# Patient Record
Sex: MALE | Race: BLACK OR AFRICAN AMERICAN | NOT HISPANIC OR LATINO | Employment: FULL TIME | RURAL
[De-identification: names, ages, dates, MRNs, and addresses within clinical notes are randomized per-mention and may not be internally consistent; named-entity substitution may affect disease eponyms.]

---

## 2023-01-24 ENCOUNTER — OFFICE VISIT (OUTPATIENT)
Dept: FAMILY MEDICINE | Facility: CLINIC | Age: 43
End: 2023-01-24
Payer: COMMERCIAL

## 2023-01-24 VITALS
WEIGHT: 227.38 LBS | HEIGHT: 68 IN | TEMPERATURE: 100 F | HEART RATE: 90 BPM | SYSTOLIC BLOOD PRESSURE: 135 MMHG | DIASTOLIC BLOOD PRESSURE: 77 MMHG | OXYGEN SATURATION: 99 % | BODY MASS INDEX: 34.46 KG/M2 | RESPIRATION RATE: 18 BRPM

## 2023-01-24 DIAGNOSIS — Z11.59 SCREENING FOR VIRAL DISEASE: ICD-10-CM

## 2023-01-24 DIAGNOSIS — Z79.899 OTHER LONG TERM (CURRENT) DRUG THERAPY: ICD-10-CM

## 2023-01-24 DIAGNOSIS — Z13.6 ENCOUNTER FOR SCREENING FOR CARDIOVASCULAR DISORDERS: ICD-10-CM

## 2023-01-24 DIAGNOSIS — R73.9 HYPERGLYCEMIA: ICD-10-CM

## 2023-01-24 DIAGNOSIS — R03.0 ELEVATED BLOOD PRESSURE READING: Primary | ICD-10-CM

## 2023-01-24 DIAGNOSIS — Z12.5 SCREENING FOR MALIGNANT NEOPLASM OF PROSTATE: ICD-10-CM

## 2023-01-24 DIAGNOSIS — L73.2 HIDRADENITIS SUPPURATIVA OF RIGHT AXILLA: ICD-10-CM

## 2023-01-24 LAB
25(OH)D3 SERPL-MCNC: 16.2 NG/ML
ALBUMIN SERPL BCP-MCNC: 2.4 G/DL (ref 3.5–5)
ALBUMIN/GLOB SERPL: 0.4 {RATIO}
ALP SERPL-CCNC: 74 U/L (ref 45–115)
ALT SERPL W P-5'-P-CCNC: 17 U/L (ref 16–61)
ANION GAP SERPL CALCULATED.3IONS-SCNC: 12 MMOL/L (ref 7–16)
AST SERPL W P-5'-P-CCNC: 14 U/L (ref 15–37)
BASOPHILS # BLD AUTO: 0.07 K/UL (ref 0–0.2)
BASOPHILS NFR BLD AUTO: 0.4 % (ref 0–1)
BILIRUB SERPL-MCNC: 0.3 MG/DL (ref ?–1.2)
BUN SERPL-MCNC: 10 MG/DL (ref 7–18)
BUN/CREAT SERPL: 11 (ref 6–20)
CALCIUM SERPL-MCNC: 8.6 MG/DL (ref 8.5–10.1)
CHLORIDE SERPL-SCNC: 104 MMOL/L (ref 98–107)
CHOLEST SERPL-MCNC: 179 MG/DL (ref 0–200)
CHOLEST/HDLC SERPL: 3.8 {RATIO}
CO2 SERPL-SCNC: 26 MMOL/L (ref 21–32)
CREAT SERPL-MCNC: 0.87 MG/DL (ref 0.7–1.3)
DIFFERENTIAL METHOD BLD: ABNORMAL
EGFR (NO RACE VARIABLE) (RUSH/TITUS): 111 ML/MIN/1.73M²
EOSINOPHIL # BLD AUTO: 0.4 K/UL (ref 0–0.5)
EOSINOPHIL NFR BLD AUTO: 2.2 % (ref 1–4)
ERYTHROCYTE [DISTWIDTH] IN BLOOD BY AUTOMATED COUNT: 14.9 % (ref 11.5–14.5)
EST. AVERAGE GLUCOSE BLD GHB EST-MCNC: 107 MG/DL
FOLATE SERPL-MCNC: 4.1 NG/ML (ref 3.1–17.5)
GLOBULIN SER-MCNC: 6.6 G/DL (ref 2–4)
GLUCOSE SERPL-MCNC: 77 MG/DL (ref 74–106)
HBA1C MFR BLD HPLC: 5.8 % (ref 4.5–6.6)
HCT VFR BLD AUTO: 31.9 % (ref 40–54)
HCV AB SER QL: NORMAL
HDLC SERPL-MCNC: 47 MG/DL (ref 40–60)
HGB BLD-MCNC: 9.8 G/DL (ref 13.5–18)
HIV 1+O+2 AB SERPL QL: NORMAL
IMM GRANULOCYTES # BLD AUTO: 0.16 K/UL (ref 0–0.04)
IMM GRANULOCYTES NFR BLD: 0.9 % (ref 0–0.4)
LDLC SERPL CALC-MCNC: 117 MG/DL
LDLC/HDLC SERPL: 2.5 {RATIO}
LYMPHOCYTES # BLD AUTO: 1.84 K/UL (ref 1–4.8)
LYMPHOCYTES NFR BLD AUTO: 10.2 % (ref 27–41)
MCH RBC QN AUTO: 28.6 PG (ref 27–31)
MCHC RBC AUTO-ENTMCNC: 30.7 G/DL (ref 32–36)
MCV RBC AUTO: 93 FL (ref 80–96)
MONOCYTES # BLD AUTO: 1.33 K/UL (ref 0–0.8)
MONOCYTES NFR BLD AUTO: 7.4 % (ref 2–6)
MPC BLD CALC-MCNC: 9.5 FL (ref 9.4–12.4)
NEUTROPHILS # BLD AUTO: 14.29 K/UL (ref 1.8–7.7)
NEUTROPHILS NFR BLD AUTO: 78.9 % (ref 53–65)
NONHDLC SERPL-MCNC: 132 MG/DL
NRBC # BLD AUTO: 0 X10E3/UL
NRBC, AUTO (.00): 0 %
PLATELET # BLD AUTO: 492 K/UL (ref 150–400)
POTASSIUM SERPL-SCNC: 4.2 MMOL/L (ref 3.5–5.1)
PROT SERPL-MCNC: 9 G/DL (ref 6.4–8.2)
PSA SERPL-MCNC: 1.23 NG/ML
RBC # BLD AUTO: 3.43 M/UL (ref 4.6–6.2)
SODIUM SERPL-SCNC: 138 MMOL/L (ref 136–145)
T4 FREE SERPL-MCNC: 1.17 NG/DL (ref 0.76–1.46)
TRIGL SERPL-MCNC: 73 MG/DL (ref 35–150)
TSH SERPL DL<=0.005 MIU/L-ACNC: 2.82 UIU/ML (ref 0.36–3.74)
VIT B12 SERPL-MCNC: 420 PG/ML (ref 193–986)
VLDLC SERPL-MCNC: 15 MG/DL
WBC # BLD AUTO: 18.09 K/UL (ref 4.5–11)

## 2023-01-24 PROCEDURE — 83036 HEMOGLOBIN A1C: ICD-10-PCS | Mod: ,,, | Performed by: CLINICAL MEDICAL LABORATORY

## 2023-01-24 PROCEDURE — 87389 HIV 1 / 2 ANTIBODY: ICD-10-PCS | Mod: ,,, | Performed by: CLINICAL MEDICAL LABORATORY

## 2023-01-24 PROCEDURE — 99214 PR OFFICE/OUTPT VISIT, EST, LEVL IV, 30-39 MIN: ICD-10-PCS | Mod: ,,, | Performed by: NURSE PRACTITIONER

## 2023-01-24 PROCEDURE — 85025 COMPLETE CBC W/AUTO DIFF WBC: CPT | Mod: ,,, | Performed by: CLINICAL MEDICAL LABORATORY

## 2023-01-24 PROCEDURE — G0103 PSA, SCREENING: ICD-10-PCS | Mod: ,,, | Performed by: CLINICAL MEDICAL LABORATORY

## 2023-01-24 PROCEDURE — 80061 LIPID PANEL: ICD-10-PCS | Mod: ,,, | Performed by: CLINICAL MEDICAL LABORATORY

## 2023-01-24 PROCEDURE — 84443 THYROID PANEL: ICD-10-PCS | Mod: ,,, | Performed by: CLINICAL MEDICAL LABORATORY

## 2023-01-24 PROCEDURE — 82043 MICROALBUMIN / CREATININE RATIO URINE: ICD-10-PCS | Mod: ,,, | Performed by: CLINICAL MEDICAL LABORATORY

## 2023-01-24 PROCEDURE — 83036 HEMOGLOBIN GLYCOSYLATED A1C: CPT | Mod: ,,, | Performed by: CLINICAL MEDICAL LABORATORY

## 2023-01-24 PROCEDURE — 86803 HEPATITIS C ANTIBODY: ICD-10-PCS | Mod: ,,, | Performed by: CLINICAL MEDICAL LABORATORY

## 2023-01-24 PROCEDURE — 86803 HEPATITIS C AB TEST: CPT | Mod: ,,, | Performed by: CLINICAL MEDICAL LABORATORY

## 2023-01-24 PROCEDURE — 87389 HIV-1 AG W/HIV-1&-2 AB AG IA: CPT | Mod: ,,, | Performed by: CLINICAL MEDICAL LABORATORY

## 2023-01-24 PROCEDURE — 82306 VITAMIN D: ICD-10-PCS | Mod: ,,, | Performed by: CLINICAL MEDICAL LABORATORY

## 2023-01-24 PROCEDURE — 82746 ASSAY OF FOLIC ACID SERUM: CPT | Mod: ,,, | Performed by: CLINICAL MEDICAL LABORATORY

## 2023-01-24 PROCEDURE — G0103 PSA SCREENING: HCPCS | Mod: ,,, | Performed by: CLINICAL MEDICAL LABORATORY

## 2023-01-24 PROCEDURE — 82607 VITAMIN B12/FOLATE, SERUM PANEL: ICD-10-PCS | Mod: ,,, | Performed by: CLINICAL MEDICAL LABORATORY

## 2023-01-24 PROCEDURE — 80053 COMPREHENSIVE METABOLIC PANEL: ICD-10-PCS | Mod: ,,, | Performed by: CLINICAL MEDICAL LABORATORY

## 2023-01-24 PROCEDURE — 84439 THYROID PANEL: ICD-10-PCS | Mod: ,,, | Performed by: CLINICAL MEDICAL LABORATORY

## 2023-01-24 PROCEDURE — 80053 COMPREHEN METABOLIC PANEL: CPT | Mod: ,,, | Performed by: CLINICAL MEDICAL LABORATORY

## 2023-01-24 PROCEDURE — 82570 MICROALBUMIN / CREATININE RATIO URINE: ICD-10-PCS | Mod: ,,, | Performed by: CLINICAL MEDICAL LABORATORY

## 2023-01-24 PROCEDURE — 82570 ASSAY OF URINE CREATININE: CPT | Mod: ,,, | Performed by: CLINICAL MEDICAL LABORATORY

## 2023-01-24 PROCEDURE — 85025 CBC WITH DIFFERENTIAL: ICD-10-PCS | Mod: ,,, | Performed by: CLINICAL MEDICAL LABORATORY

## 2023-01-24 PROCEDURE — 82043 UR ALBUMIN QUANTITATIVE: CPT | Mod: ,,, | Performed by: CLINICAL MEDICAL LABORATORY

## 2023-01-24 PROCEDURE — 84443 ASSAY THYROID STIM HORMONE: CPT | Mod: ,,, | Performed by: CLINICAL MEDICAL LABORATORY

## 2023-01-24 PROCEDURE — 99214 OFFICE O/P EST MOD 30 MIN: CPT | Mod: ,,, | Performed by: NURSE PRACTITIONER

## 2023-01-24 PROCEDURE — 84439 ASSAY OF FREE THYROXINE: CPT | Mod: ,,, | Performed by: CLINICAL MEDICAL LABORATORY

## 2023-01-24 PROCEDURE — 82306 VITAMIN D 25 HYDROXY: CPT | Mod: ,,, | Performed by: CLINICAL MEDICAL LABORATORY

## 2023-01-24 PROCEDURE — 82607 VITAMIN B-12: CPT | Mod: ,,, | Performed by: CLINICAL MEDICAL LABORATORY

## 2023-01-24 PROCEDURE — 80061 LIPID PANEL: CPT | Mod: ,,, | Performed by: CLINICAL MEDICAL LABORATORY

## 2023-01-24 PROCEDURE — 82746 VITAMIN B12/FOLATE, SERUM PANEL: ICD-10-PCS | Mod: ,,, | Performed by: CLINICAL MEDICAL LABORATORY

## 2023-01-24 RX ORDER — CLINDAMYCIN PHOSPHATE 10 MG/G
GEL TOPICAL 2 TIMES DAILY
Qty: 60 G | Refills: 0 | Status: SHIPPED | OUTPATIENT
Start: 2023-01-24

## 2023-01-24 RX ORDER — DOXYCYCLINE 100 MG/1
100 CAPSULE ORAL 2 TIMES DAILY
Qty: 14 CAPSULE | Refills: 0 | Status: SHIPPED | OUTPATIENT
Start: 2023-01-24 | End: 2023-05-16

## 2023-01-24 NOTE — LETTER
January 24, 2023      [facility]  [address] [address]  Phone: [phone]  Fax: [phone]       Patient: Ulisses Ortiz   YOB: 1980  Date of Visit: 01/24/2023    To Whom It May Concern:    Andrés Ortiz  was at [facility] on 01/24/2023. The patient may return to work/school on 01/25/2023 with no restrictions. If you have any questions or concerns, or if I can be of further assistance, please do not hesitate to contact me.    Sincerely,    Monica Garcia RN

## 2023-01-24 NOTE — PROGRESS NOTES
"   Leora Laird DNP   [address] [place] [address]     PATIENT NAME: Ulisses Ortiz  : 1980  DATE: 23  MRN: [id number]      Billing Provider: Leora Laird DNP  Level of Service:   Patient PCP Information       Provider PCP Type    Leora Laird DNP General            Reason for Visit / Chief Complaint: new Patient and HS (Hidradenitis Suppurativa )       Update PCP  Update Chief Complaint         History of Present Illness / Problem Focused Workflow     Ulisses Ortiz presents to the clinic with new Patient and HS (Hidradenitis Suppurativa )     HPI    Review of Systems     Review of Systems     Medical / Social / Family History     Past Medical History:   Diagnosis Date    Hidradenitis suppurativa        History reviewed. No pertinent surgical history.    Social History    reports that he has never smoked. He has never been exposed to tobacco smoke. He has never used smokeless tobacco. He reports current alcohol use. He reports current drug use. Drug: Marijuana.    Family History  's family history includes Diabetes in his mother; Hypertension in his father.    Medications and Allergies     Medications  No outpatient medications have been marked as taking for the 23 encounter (Office Visit) with Leora Laird DNP.       Allergies  Review of patient's allergies indicates:  No Known Allergies    Physical Examination   /77 (BP Location: Left arm, Patient Position: Sitting, BP Method: X-Large (Automatic))   Pulse 90   Temp 99.5 °F (37.5 °C) (Oral)   Resp 18   Ht 5' 8" (1.727 m)   Wt 103.1 kg (227 lb 6.4 oz)   SpO2 99%   BMI 34.58 kg/m²    Physical Exam  Vitals and nursing note reviewed.   Constitutional:       Appearance: Normal appearance. He is obese.   HENT:      Head: Normocephalic and atraumatic.      Nose: Nose normal.      Mouth/Throat:      Mouth: Mucous membranes are moist.   Eyes:      Pupils: Pupils are equal, round, and reactive to light. "   Cardiovascular:      Rate and Rhythm: Normal rate and regular rhythm.      Pulses: Normal pulses.      Heart sounds: Normal heart sounds.   Pulmonary:      Effort: Pulmonary effort is normal.      Breath sounds: Normal breath sounds.   Abdominal:      General: Abdomen is flat. Bowel sounds are normal.      Palpations: Abdomen is soft.   Musculoskeletal:         General: Normal range of motion.      Cervical back: Normal range of motion and neck supple.   Skin:     General: Skin is warm and dry.      Capillary Refill: Capillary refill takes less than 2 seconds.      Findings: Lesion present.      Comments: Multiple scattered group HS to face, some scarring and tracks noted.  Painful and worse to rt axilla.   And groin    Neurological:      General: No focal deficit present.      Mental Status: He is alert and oriented to person, place, and time. Mental status is at baseline.        Assessment and Plan (including Health Maintenance)      Problem List  [employer]  Document Outside HM   :    Plan:         Health Maintenance Due   Topic Date Due    Hepatitis C Screening  Never done    Lipid Panel  Never done    COVID-19 Vaccine (1) Never done    HIV Screening  Never done    TETANUS VACCINE  Never done    Hemoglobin A1c (Diabetic Prevention Screening)  Never done    Influenza Vaccine (1) Never done       Problem List Items Addressed This Visit          Derm    Hidradenitis suppurativa of right axilla    Relevant Orders    Ambulatory referral/consult to Dermatology       Cardiac/Vascular    Encounter for screening for cardiovascular disorders    Relevant Orders    Lipid Panel    Elevated blood pressure reading - Primary    Relevant Orders    CBC Auto Differential    Comprehensive Metabolic Panel    Microalbumin/Creatinine Ratio, Urine       ID    Screening for viral disease    Relevant Orders    Hepatitis C Antibody    HIV 1/2 Ag/Ab (4th Gen)       Endocrine    Hyperglycemia    Relevant Orders    Hemoglobin A1C        Palliative Care    Other long term (current) drug therapy    Relevant Orders    Vitamin D    Vitamin B12 & Folate    Thyroid Panel       The patient has no Health Maintenance topics of status Not Due    No future appointments.         Signature:  Leora Laird, RONIT      [address] [place] [address]    Date of encounter: 1/24/23

## 2023-01-25 LAB
CREAT UR-MCNC: 191 MG/DL (ref 39–259)
MICROALBUMIN UR-MCNC: 1.5 MG/DL (ref 0–2.8)
MICROALBUMIN/CREAT RATIO PNL UR: 7.9 MG/G (ref 0–30)

## 2023-01-25 RX ORDER — ERGOCALCIFEROL 1.25 MG/1
50000 CAPSULE ORAL
COMMUNITY
End: 2023-01-25 | Stop reason: SDUPTHER

## 2023-01-25 RX ORDER — ERGOCALCIFEROL 1.25 MG/1
50000 CAPSULE ORAL
Qty: 12 CAPSULE | Refills: 1 | Status: SHIPPED | OUTPATIENT
Start: 2023-01-25 | End: 2023-02-21 | Stop reason: SDUPTHER

## 2023-01-25 NOTE — PROGRESS NOTES
Cbc is way off... needs to come back in 1 week since I have no baseline and do cbc and iron panel..  Also needs vit d 13339 weekly.. maybe he had been sick recently or something?

## 2023-01-25 NOTE — TELEPHONE ENCOUNTER
----- Message from Leora Laird DNP sent at 1/25/2023  3:17 PM CST -----  Cbc is way off... needs to come back in 1 week since I have no baseline and do cbc and iron panel..  Also needs vit d 53946 weekly.. maybe he had been sick recently or something?

## 2023-01-31 ENCOUNTER — CLINICAL SUPPORT (OUTPATIENT)
Dept: FAMILY MEDICINE | Facility: CLINIC | Age: 43
End: 2023-01-31

## 2023-01-31 DIAGNOSIS — Z11.59 SCREENING FOR VIRAL DISEASE: Primary | ICD-10-CM

## 2023-01-31 PROCEDURE — 99499 UNLISTED E&M SERVICE: CPT | Mod: ,,, | Performed by: NURSE PRACTITIONER

## 2023-01-31 PROCEDURE — 85025 COMPLETE CBC W/AUTO DIFF WBC: CPT | Mod: ,,, | Performed by: CLINICAL MEDICAL LABORATORY

## 2023-01-31 PROCEDURE — 99499 NO LOS: ICD-10-PCS | Mod: ,,, | Performed by: NURSE PRACTITIONER

## 2023-01-31 PROCEDURE — 85025 CBC WITH DIFFERENTIAL: ICD-10-PCS | Mod: ,,, | Performed by: CLINICAL MEDICAL LABORATORY

## 2023-02-01 LAB
BASOPHILS # BLD AUTO: 0.06 K/UL (ref 0–0.2)
BASOPHILS NFR BLD AUTO: 0.3 % (ref 0–1)
DIFFERENTIAL METHOD BLD: ABNORMAL
EOSINOPHIL # BLD AUTO: 0.42 K/UL (ref 0–0.5)
EOSINOPHIL NFR BLD AUTO: 2.4 % (ref 1–4)
ERYTHROCYTE [DISTWIDTH] IN BLOOD BY AUTOMATED COUNT: 14.9 % (ref 11.5–14.5)
HCT VFR BLD AUTO: 31.1 % (ref 40–54)
HGB BLD-MCNC: 9.4 G/DL (ref 13.5–18)
IMM GRANULOCYTES # BLD AUTO: 0.08 K/UL (ref 0–0.04)
IMM GRANULOCYTES NFR BLD: 0.5 % (ref 0–0.4)
LYMPHOCYTES # BLD AUTO: 2.27 K/UL (ref 1–4.8)
LYMPHOCYTES NFR BLD AUTO: 13 % (ref 27–41)
MCH RBC QN AUTO: 28.2 PG (ref 27–31)
MCHC RBC AUTO-ENTMCNC: 30.2 G/DL (ref 32–36)
MCV RBC AUTO: 93.4 FL (ref 80–96)
MONOCYTES # BLD AUTO: 1.17 K/UL (ref 0–0.8)
MONOCYTES NFR BLD AUTO: 6.7 % (ref 2–6)
MPC BLD CALC-MCNC: 9.7 FL (ref 9.4–12.4)
NEUTROPHILS # BLD AUTO: 13.52 K/UL (ref 1.8–7.7)
NEUTROPHILS NFR BLD AUTO: 77.1 % (ref 53–65)
NRBC # BLD AUTO: 0 X10E3/UL
NRBC, AUTO (.00): 0 %
PLATELET # BLD AUTO: 515 K/UL (ref 150–400)
RBC # BLD AUTO: 3.33 M/UL (ref 4.6–6.2)
WBC # BLD AUTO: 17.52 K/UL (ref 4.5–11)

## 2023-02-08 RX ORDER — CLINDAMYCIN HYDROCHLORIDE 300 MG/1
300 CAPSULE ORAL 3 TIMES DAILY
COMMUNITY
End: 2023-02-08 | Stop reason: SDUPTHER

## 2023-02-08 RX ORDER — CLINDAMYCIN HYDROCHLORIDE 300 MG/1
300 CAPSULE ORAL 3 TIMES DAILY
Qty: 30 CAPSULE | Refills: 0 | Status: SHIPPED | OUTPATIENT
Start: 2023-02-08 | End: 2023-02-23

## 2023-02-08 NOTE — TELEPHONE ENCOUNTER
----- Message from Leora Laird, DNP sent at 2/2/2023  1:44 AM CST -----  I think his HS is causing his labs to be off   He has appt on 2/21   In meantime send in clinda 300 tid x10 days and lets see if that helps any

## 2023-02-21 ENCOUNTER — OFFICE VISIT (OUTPATIENT)
Dept: DERMATOLOGY | Facility: CLINIC | Age: 43
End: 2023-02-21
Payer: COMMERCIAL

## 2023-02-21 VITALS — BODY MASS INDEX: 31.47 KG/M2 | WEIGHT: 207 LBS

## 2023-02-21 DIAGNOSIS — L73.2 HIDRADENITIS SUPPURATIVA: Primary | ICD-10-CM

## 2023-02-21 DIAGNOSIS — L05.91 PILONIDAL CYST: ICD-10-CM

## 2023-02-21 DIAGNOSIS — L73.2 HIDRADENITIS SUPPURATIVA OF RIGHT AXILLA: ICD-10-CM

## 2023-02-21 PROCEDURE — 3044F HG A1C LEVEL LT 7.0%: CPT | Mod: ,,, | Performed by: STUDENT IN AN ORGANIZED HEALTH CARE EDUCATION/TRAINING PROGRAM

## 2023-02-21 PROCEDURE — 3008F BODY MASS INDEX DOCD: CPT | Mod: ,,, | Performed by: STUDENT IN AN ORGANIZED HEALTH CARE EDUCATION/TRAINING PROGRAM

## 2023-02-21 PROCEDURE — 1159F MED LIST DOCD IN RCRD: CPT | Mod: ,,, | Performed by: STUDENT IN AN ORGANIZED HEALTH CARE EDUCATION/TRAINING PROGRAM

## 2023-02-21 PROCEDURE — 3008F PR BODY MASS INDEX (BMI) DOCUMENTED: ICD-10-PCS | Mod: ,,, | Performed by: STUDENT IN AN ORGANIZED HEALTH CARE EDUCATION/TRAINING PROGRAM

## 2023-02-21 PROCEDURE — 99204 PR OFFICE/OUTPT VISIT, NEW, LEVL IV, 45-59 MIN: ICD-10-PCS | Mod: ,,, | Performed by: STUDENT IN AN ORGANIZED HEALTH CARE EDUCATION/TRAINING PROGRAM

## 2023-02-21 PROCEDURE — 87077 CULTURE, WOUND: ICD-10-PCS | Mod: ,,, | Performed by: CLINICAL MEDICAL LABORATORY

## 2023-02-21 PROCEDURE — 1160F PR REVIEW ALL MEDS BY PRESCRIBER/CLIN PHARMACIST DOCUMENTED: ICD-10-PCS | Mod: ,,, | Performed by: STUDENT IN AN ORGANIZED HEALTH CARE EDUCATION/TRAINING PROGRAM

## 2023-02-21 PROCEDURE — 87070 CULTURE, WOUND: ICD-10-PCS | Mod: ,,, | Performed by: CLINICAL MEDICAL LABORATORY

## 2023-02-21 PROCEDURE — 99204 OFFICE O/P NEW MOD 45 MIN: CPT | Mod: ,,, | Performed by: STUDENT IN AN ORGANIZED HEALTH CARE EDUCATION/TRAINING PROGRAM

## 2023-02-21 PROCEDURE — 1159F PR MEDICATION LIST DOCUMENTED IN MEDICAL RECORD: ICD-10-PCS | Mod: ,,, | Performed by: STUDENT IN AN ORGANIZED HEALTH CARE EDUCATION/TRAINING PROGRAM

## 2023-02-21 PROCEDURE — 87077 CULTURE AEROBIC IDENTIFY: CPT | Mod: ,,, | Performed by: CLINICAL MEDICAL LABORATORY

## 2023-02-21 PROCEDURE — 87070 CULTURE OTHR SPECIMN AEROBIC: CPT | Mod: ,,, | Performed by: CLINICAL MEDICAL LABORATORY

## 2023-02-21 PROCEDURE — 1160F RVW MEDS BY RX/DR IN RCRD: CPT | Mod: ,,, | Performed by: STUDENT IN AN ORGANIZED HEALTH CARE EDUCATION/TRAINING PROGRAM

## 2023-02-21 PROCEDURE — 3044F PR MOST RECENT HEMOGLOBIN A1C LEVEL <7.0%: ICD-10-PCS | Mod: ,,, | Performed by: STUDENT IN AN ORGANIZED HEALTH CARE EDUCATION/TRAINING PROGRAM

## 2023-02-21 RX ORDER — ISOTRETINOIN 40 MG/1
40 CAPSULE ORAL DAILY
Qty: 30 CAPSULE | Refills: 0 | Status: SHIPPED | OUTPATIENT
Start: 2023-02-21 | End: 2023-02-23 | Stop reason: SDUPTHER

## 2023-02-21 RX ORDER — PREDNISONE 20 MG/1
TABLET ORAL
Qty: 25 TABLET | Refills: 1 | Status: SHIPPED | OUTPATIENT
Start: 2023-02-21 | End: 2023-02-27 | Stop reason: SDUPTHER

## 2023-02-21 RX ORDER — ERGOCALCIFEROL 1.25 MG/1
50000 CAPSULE ORAL
Qty: 12 CAPSULE | Refills: 1 | Status: SHIPPED | OUTPATIENT
Start: 2023-02-21

## 2023-02-21 RX ORDER — ISOTRETINOIN 20 MG/1
40 CAPSULE ORAL DAILY
Qty: 60 CAPSULE | Refills: 0 | Status: CANCELLED | OUTPATIENT
Start: 2023-02-21 | End: 2023-03-23

## 2023-02-21 NOTE — TELEPHONE ENCOUNTER
----- Message from Nicole Alvarez sent at 2/21/2023  3:26 PM CST -----  Patient needs his Vitamin D medications sent to [facility] in [place] [phone].

## 2023-02-21 NOTE — PROGRESS NOTES
[facility]  Sohail Cazares MD    [address] [address]  [phone]    Fax: [phone]    Patient Name: Ulisses Ortiz  Medical Record Number: [id number]  PCP: Leora Laird DNP  Age: 42 y.o. : 1980  Contact: [phone] (home)     CC:   History of Present Illness:     Ulisses Ortiz is a 42 y.o.  male with no history of skin cancer who presents to clinic today for Hidradenitis Suppurativa. He has active draining cysts of R axilla, gluteal cleft,and groin.  This has been present for several years. Symptoms include constant drainage, tenderness, and inability to work as a  due to pain. Previous treatments include numerous rounds of antibiotics with minimal improvement and Humira x 6 months. He had some improvement with humira but had severe side effects and had to discontinue. He is desperate for treatment.  Other concerns today are none.      The patient has no other concerns today.    Review of Systems:     Unremarkable other than mentioned above.     Physical Exam:     General: Relaxed, oriented, alert    Skin examination of the scalp, face, neck, chest, back, abdomen, upper extremities and lower extremities were normal except for as listed below      Assessment and Plan:     1. Hidradenitis Suppurativa  - Fistula formation and draining sinuses, weeping acneiform pustules and papules, scarring, and acneiform nodules  Coreas Stage: 3    Plan:   - wound culture obtained   - discussed Isotretinoin risk and benefits and initiated paperwork   - Prednisone 40 mg daily x 7 days, 20 mg daily x 7 days, then 10 mg daily x 7 days   - will start Isotretinoin 40 mg daily x 30 days     Counseling.  Cleanse acneiform lesions and sinus tracts with anti-bacterial washes. Oral antibiotics can help reduce inflammation.  Discussed importance of not smoking and weight loss       2. Pilonidal Cyst  - subcutaneous cyst with prominent follicular pore located  on the superior gluteal cleft    Plan:   Will refer to General surgery for excision      3. High Risk Medication Monitoring : The risks and benefits of the medication were reviewed in full with the patient. Should any side effects occur, the patient will stop the medication and contact me immediately.     We discussed that there are many potential side effects associated with isotretinoin, some mild and some severe. They are often dose related. Side effects include teratogenicity, dry skin and mucous membranes, rash, photosensitivity, decreased wound healing, hair loss, headaches, vision problems, muscle and/or joint aches, bone abnormalities, and GI symptoms. We discussed need to monitor blood tests for lipid, liver and blood cell abnormalities. Females will need monthly blood or urine pregnancy tests. There is also a possible association with depression, suicidal thoughts and inflammatory bowel disease. Vitamin A supplements, excessive ETOH, and tetracycline derivatives should be avoided while on this medication.     Return to clinic in 1 month    AVS printed with patient instructions     Sohail Cazares MD   Mohs Surgery/Dermatologic Oncology  Dermatology

## 2023-02-23 DIAGNOSIS — L73.2 HIDRADENITIS SUPPURATIVA: Primary | ICD-10-CM

## 2023-02-23 LAB — MICROORGANISM SPEC CULT: ABNORMAL

## 2023-02-23 RX ORDER — ISOTRETINOIN 40 MG/1
40 CAPSULE ORAL DAILY
Qty: 30 CAPSULE | Refills: 0 | Status: SHIPPED | OUTPATIENT
Start: 2023-02-23 | End: 2023-03-25 | Stop reason: SDUPTHER

## 2023-02-23 RX ORDER — CEPHALEXIN 500 MG/1
500 CAPSULE ORAL EVERY 8 HOURS
Qty: 21 CAPSULE | Refills: 0 | Status: SHIPPED | OUTPATIENT
Start: 2023-02-23 | End: 2023-02-24 | Stop reason: SDUPTHER

## 2023-02-24 DIAGNOSIS — L08.9 SKIN INFECTION: ICD-10-CM

## 2023-02-24 DIAGNOSIS — L73.2 HIDRADENITIS SUPPURATIVA: Primary | ICD-10-CM

## 2023-02-24 RX ORDER — CEPHALEXIN 500 MG/1
500 CAPSULE ORAL EVERY 8 HOURS
Qty: 21 CAPSULE | Refills: 0 | Status: SHIPPED | OUTPATIENT
Start: 2023-02-24 | End: 2023-02-27 | Stop reason: SDUPTHER

## 2023-02-27 DIAGNOSIS — L73.2 HIDRADENITIS SUPPURATIVA: ICD-10-CM

## 2023-02-27 DIAGNOSIS — L08.9 SKIN INFECTION: ICD-10-CM

## 2023-02-27 RX ORDER — PREDNISONE 20 MG/1
TABLET ORAL
Qty: 25 TABLET | Refills: 1 | Status: SHIPPED | OUTPATIENT
Start: 2023-02-27 | End: 2023-03-20

## 2023-02-27 RX ORDER — CEPHALEXIN 500 MG/1
500 CAPSULE ORAL EVERY 8 HOURS
Qty: 21 CAPSULE | Refills: 0 | Status: SHIPPED | OUTPATIENT
Start: 2023-02-27 | End: 2023-03-06

## 2023-03-21 ENCOUNTER — OFFICE VISIT (OUTPATIENT)
Dept: DERMATOLOGY | Facility: CLINIC | Age: 43
End: 2023-03-21
Payer: COMMERCIAL

## 2023-03-21 DIAGNOSIS — Z79.899 HIGH RISK MEDICATION USE: ICD-10-CM

## 2023-03-21 DIAGNOSIS — L73.2 HIDRADENITIS SUPPURATIVA: Primary | ICD-10-CM

## 2023-03-21 PROCEDURE — 3044F HG A1C LEVEL LT 7.0%: CPT | Mod: ,,, | Performed by: STUDENT IN AN ORGANIZED HEALTH CARE EDUCATION/TRAINING PROGRAM

## 2023-03-21 PROCEDURE — 99214 OFFICE O/P EST MOD 30 MIN: CPT | Mod: ,,, | Performed by: STUDENT IN AN ORGANIZED HEALTH CARE EDUCATION/TRAINING PROGRAM

## 2023-03-21 PROCEDURE — 3044F PR MOST RECENT HEMOGLOBIN A1C LEVEL <7.0%: ICD-10-PCS | Mod: ,,, | Performed by: STUDENT IN AN ORGANIZED HEALTH CARE EDUCATION/TRAINING PROGRAM

## 2023-03-21 PROCEDURE — 99214 PR OFFICE/OUTPT VISIT, EST, LEVL IV, 30-39 MIN: ICD-10-PCS | Mod: ,,, | Performed by: STUDENT IN AN ORGANIZED HEALTH CARE EDUCATION/TRAINING PROGRAM

## 2023-03-21 PROCEDURE — 1159F MED LIST DOCD IN RCRD: CPT | Mod: ,,, | Performed by: STUDENT IN AN ORGANIZED HEALTH CARE EDUCATION/TRAINING PROGRAM

## 2023-03-21 PROCEDURE — 1159F PR MEDICATION LIST DOCUMENTED IN MEDICAL RECORD: ICD-10-PCS | Mod: ,,, | Performed by: STUDENT IN AN ORGANIZED HEALTH CARE EDUCATION/TRAINING PROGRAM

## 2023-03-21 RX ORDER — ISOTRETINOIN 40 MG/1
40 CAPSULE ORAL 2 TIMES DAILY
Qty: 30 CAPSULE | Refills: 0 | Status: CANCELLED | OUTPATIENT
Start: 2023-03-21 | End: 2023-09-19

## 2023-03-21 NOTE — PROGRESS NOTES
[facility]   Sohail Cazares MD    Patient Name: Ulisses Ortiz  Patient YOB: 1980  Date of Service: 3/21/23    CC: Isotretinoin Follow-up    HPI: Ulisses Ortiz is a 42 y.o. male who is following up for hidradenitis currently on isotretinoin.  His current dose is 40 mg daily and his cumulative dose is 13 mg/kg.  He has followed the treatment plan as prescribed.  Overall, his hidradenitis suppurativa has improved.  Side effects include dry lips.    Review of systems was negative for headache, upset stomach, joint pain, and mood change.    Past Medical History:   Diagnosis Date    Hidradenitis suppurativa      No past surgical history on file.  Review of patient's allergies indicates:  No Known Allergies    Current Outpatient Medications:     clindamycin phosphate 1% (CLINDAGEL) 1 % gel, Apply topically 2 (two) times daily., Disp: 60 g, Rfl: 0    doxycycline (MONODOX) 100 MG capsule, Take 1 capsule (100 mg total) by mouth 2 (two) times daily., Disp: 14 capsule, Rfl: 0    ergocalciferol (ERGOCALCIFEROL) 50,000 unit Cap, Take 1 capsule (50,000 Units total) by mouth every 7 days., Disp: 12 capsule, Rfl: 1    ISOtretinoin (ACCUTANE) 40 MG capsule, Take 1 capsule (40 mg total) by mouth once daily., Disp: 30 capsule, Rfl: 0    Exam: A skin exam included his face, chest and back.  General Appearance of the patient is well developed and well nourished.  Orientation: alert and oriented x 3.  Mood and affect: pleasant.  Findings in the above examined areas were normal with the exception of the following exam descriptions below:    Hidradenitis Suppurativa  - Fistula formation and draining sinuses, weeping acneiform pustules and papules, scarring, and acneiform nodules  Coreas Stage: 3    Plan:   - increase isotretinoin to 40 mg bid     Counseling.  Cleanse acneiform lesions and sinus tracts with anti-bacterial washes. Oral antibiotics can help reduce inflammation.  Discussed importance of not smoking and  weight loss      Plan: Isotretinoin Monitoring.  Patient weight: 93.9 kg    Months of Therapy: 1  Dosage Month 1: 40 mg daily      Cumulative Dosage: 13 mg/ kg    Treatment Protocol:  1 mg/kg until a cumulative dose of 120-150 mg/kg is reached.  Secondary Contraception Method: Male latex condom.  Labs: Pending  Counseling:  I reviewed the side effect in detail. Patient should get monthly blood tests, not donate blood, not drive at night if vision affected, and not share  medication.  Side Effects:  No Depression  Cheilitis - I recommended application of Vaseline or Aquaphor numerous times a day (as often as every hour) and before going to bed.  Xerosis - I recommended application of Cetaphil or CeraVe numerous times a day and before going to bed to all dry areas.  No Nosebleeds  No Headache  No Myalgia  No Hypercholesterolemia    Action Items:  Patient confirmed in iPledge today.  Rx sent in today.         High Risk Medication Monitoring (Z79.899) : The risks and benefits of the medication were reviewed in full with the patient. Should any side effects occur, the patient will stop the medication and contact me immediately.  - will order LFTs and fasting TAGs    No follow-ups on file.    Sohail Cazares MD

## 2023-03-24 ENCOUNTER — CLINICAL SUPPORT (OUTPATIENT)
Dept: FAMILY MEDICINE | Facility: CLINIC | Age: 43
End: 2023-03-24
Payer: COMMERCIAL

## 2023-03-24 DIAGNOSIS — Z79.899 HIGH RISK MEDICATION USE: ICD-10-CM

## 2023-03-24 DIAGNOSIS — Z79.899 OTHER LONG TERM (CURRENT) DRUG THERAPY: Primary | ICD-10-CM

## 2023-03-24 DIAGNOSIS — D72.829 LEUKOCYTOSIS, UNSPECIFIED TYPE: ICD-10-CM

## 2023-03-24 LAB
ALBUMIN SERPL BCP-MCNC: 2.6 G/DL (ref 3.5–5)
ALBUMIN/GLOB SERPL: 0.5 {RATIO}
ALP SERPL-CCNC: 77 U/L (ref 45–115)
ALT SERPL W P-5'-P-CCNC: 17 U/L (ref 16–61)
ANION GAP SERPL CALCULATED.3IONS-SCNC: 9 MMOL/L (ref 7–16)
AST SERPL W P-5'-P-CCNC: 9 U/L (ref 15–37)
BASOPHILS # BLD AUTO: 0.04 K/UL (ref 0–0.2)
BASOPHILS NFR BLD AUTO: 0.2 % (ref 0–1)
BILIRUB DIRECT SERPL-MCNC: <0.1 MG/DL (ref 0–0.2)
BILIRUB SERPL-MCNC: 0.2 MG/DL (ref ?–1.2)
BUN SERPL-MCNC: 8 MG/DL (ref 7–18)
BUN/CREAT SERPL: 9 (ref 6–20)
CALCIUM SERPL-MCNC: 8.8 MG/DL (ref 8.5–10.1)
CHLORIDE SERPL-SCNC: 106 MMOL/L (ref 98–107)
CO2 SERPL-SCNC: 30 MMOL/L (ref 21–32)
CREAT SERPL-MCNC: 0.91 MG/DL (ref 0.7–1.3)
DIFFERENTIAL METHOD BLD: ABNORMAL
EGFR (NO RACE VARIABLE) (RUSH/TITUS): 109 ML/MIN/1.73M²
EOSINOPHIL # BLD AUTO: 0.37 K/UL (ref 0–0.5)
EOSINOPHIL NFR BLD AUTO: 2.3 % (ref 1–4)
ERYTHROCYTE [DISTWIDTH] IN BLOOD BY AUTOMATED COUNT: 18.8 % (ref 11.5–14.5)
GLOBULIN SER-MCNC: 5.3 G/DL (ref 2–4)
GLUCOSE SERPL-MCNC: 91 MG/DL (ref 74–106)
HCT VFR BLD AUTO: 36.2 % (ref 40–54)
HGB BLD-MCNC: 10.9 G/DL (ref 13.5–18)
IMM GRANULOCYTES # BLD AUTO: 0.09 K/UL (ref 0–0.04)
IMM GRANULOCYTES NFR BLD: 0.6 % (ref 0–0.4)
IRON SATN MFR SERPL: 11 % (ref 14–50)
IRON SERPL-MCNC: 22 ΜG/DL (ref 65–175)
LYMPHOCYTES # BLD AUTO: 3.94 K/UL (ref 1–4.8)
LYMPHOCYTES NFR BLD AUTO: 24.1 % (ref 27–41)
MCH RBC QN AUTO: 28.5 PG (ref 27–31)
MCHC RBC AUTO-ENTMCNC: 30.1 G/DL (ref 32–36)
MCV RBC AUTO: 94.8 FL (ref 80–96)
MONOCYTES # BLD AUTO: 1.31 K/UL (ref 0–0.8)
MONOCYTES NFR BLD AUTO: 8 % (ref 2–6)
MPC BLD CALC-MCNC: 10.2 FL (ref 9.4–12.4)
NEUTROPHILS # BLD AUTO: 10.58 K/UL (ref 1.8–7.7)
NEUTROPHILS NFR BLD AUTO: 64.8 % (ref 53–65)
NRBC # BLD AUTO: 0 X10E3/UL
NRBC, AUTO (.00): 0 %
PLATELET # BLD AUTO: 355 K/UL (ref 150–400)
POTASSIUM SERPL-SCNC: 3.6 MMOL/L (ref 3.5–5.1)
PROT SERPL-MCNC: 7.9 G/DL (ref 6.4–8.2)
RBC # BLD AUTO: 3.82 M/UL (ref 4.6–6.2)
SODIUM SERPL-SCNC: 141 MMOL/L (ref 136–145)
TIBC SERPL-MCNC: 202 ΜG/DL (ref 250–450)
TRIGL SERPL-MCNC: 71 MG/DL (ref 35–150)
WBC # BLD AUTO: 16.33 K/UL (ref 4.5–11)

## 2023-03-24 PROCEDURE — 82248 BILIRUBIN, DIRECT: ICD-10-PCS | Mod: ,,, | Performed by: CLINICAL MEDICAL LABORATORY

## 2023-03-24 PROCEDURE — 82248 BILIRUBIN DIRECT: CPT | Mod: ,,, | Performed by: CLINICAL MEDICAL LABORATORY

## 2023-03-24 PROCEDURE — 83540 ASSAY OF IRON: CPT | Mod: ,,, | Performed by: CLINICAL MEDICAL LABORATORY

## 2023-03-24 PROCEDURE — 84478 ASSAY OF TRIGLYCERIDES: CPT | Mod: ,,, | Performed by: CLINICAL MEDICAL LABORATORY

## 2023-03-24 PROCEDURE — 84478 TRIGLYCERIDES: ICD-10-PCS | Mod: ,,, | Performed by: CLINICAL MEDICAL LABORATORY

## 2023-03-24 PROCEDURE — 83550 IRON BINDING TEST: CPT | Mod: ,,, | Performed by: CLINICAL MEDICAL LABORATORY

## 2023-03-24 PROCEDURE — 85025 COMPLETE CBC W/AUTO DIFF WBC: CPT | Mod: ,,, | Performed by: CLINICAL MEDICAL LABORATORY

## 2023-03-24 PROCEDURE — 83550 IRON AND TIBC: ICD-10-PCS | Mod: ,,, | Performed by: CLINICAL MEDICAL LABORATORY

## 2023-03-24 PROCEDURE — 83540 IRON AND TIBC: ICD-10-PCS | Mod: ,,, | Performed by: CLINICAL MEDICAL LABORATORY

## 2023-03-24 PROCEDURE — 80053 COMPREHEN METABOLIC PANEL: CPT | Mod: ,,, | Performed by: CLINICAL MEDICAL LABORATORY

## 2023-03-24 PROCEDURE — 80053 COMPREHENSIVE METABOLIC PANEL: ICD-10-PCS | Mod: ,,, | Performed by: CLINICAL MEDICAL LABORATORY

## 2023-03-24 PROCEDURE — 85025 CBC WITH DIFFERENTIAL: ICD-10-PCS | Mod: ,,, | Performed by: CLINICAL MEDICAL LABORATORY

## 2023-03-25 RX ORDER — ISOTRETINOIN 40 MG/1
40 CAPSULE ORAL 2 TIMES DAILY
Qty: 60 CAPSULE | Refills: 0 | Status: SHIPPED | OUTPATIENT
Start: 2023-03-25 | End: 2023-04-20 | Stop reason: SDUPTHER

## 2023-04-04 ENCOUNTER — OFFICE VISIT (OUTPATIENT)
Dept: SURGERY | Facility: CLINIC | Age: 43
End: 2023-04-04
Attending: SURGERY
Payer: COMMERCIAL

## 2023-04-04 VITALS
SYSTOLIC BLOOD PRESSURE: 120 MMHG | OXYGEN SATURATION: 98 % | TEMPERATURE: 98 F | HEART RATE: 69 BPM | DIASTOLIC BLOOD PRESSURE: 70 MMHG

## 2023-04-04 DIAGNOSIS — L73.2 HIDRADENITIS SUPPURATIVA OF RIGHT AXILLA: ICD-10-CM

## 2023-04-04 DIAGNOSIS — L73.2 HIDRADENITIS SUPPURATIVA: Primary | ICD-10-CM

## 2023-04-04 PROCEDURE — 1159F MED LIST DOCD IN RCRD: CPT | Mod: ,,, | Performed by: SURGERY

## 2023-04-04 PROCEDURE — 3078F DIAST BP <80 MM HG: CPT | Mod: ,,, | Performed by: SURGERY

## 2023-04-04 PROCEDURE — 3074F PR MOST RECENT SYSTOLIC BLOOD PRESSURE < 130 MM HG: ICD-10-PCS | Mod: ,,, | Performed by: SURGERY

## 2023-04-04 PROCEDURE — 3078F PR MOST RECENT DIASTOLIC BLOOD PRESSURE < 80 MM HG: ICD-10-PCS | Mod: ,,, | Performed by: SURGERY

## 2023-04-04 PROCEDURE — 3074F SYST BP LT 130 MM HG: CPT | Mod: ,,, | Performed by: SURGERY

## 2023-04-04 PROCEDURE — 99204 OFFICE O/P NEW MOD 45 MIN: CPT | Mod: S$PBB,,, | Performed by: SURGERY

## 2023-04-04 PROCEDURE — 1159F PR MEDICATION LIST DOCUMENTED IN MEDICAL RECORD: ICD-10-PCS | Mod: ,,, | Performed by: SURGERY

## 2023-04-04 PROCEDURE — 3044F PR MOST RECENT HEMOGLOBIN A1C LEVEL <7.0%: ICD-10-PCS | Mod: ,,, | Performed by: SURGERY

## 2023-04-04 PROCEDURE — 99204 PR OFFICE/OUTPT VISIT, NEW, LEVL IV, 45-59 MIN: ICD-10-PCS | Mod: S$PBB,,, | Performed by: SURGERY

## 2023-04-04 PROCEDURE — 3044F HG A1C LEVEL LT 7.0%: CPT | Mod: ,,, | Performed by: SURGERY

## 2023-04-04 PROCEDURE — 99215 OFFICE O/P EST HI 40 MIN: CPT | Mod: PBBFAC | Performed by: SURGERY

## 2023-04-04 NOTE — PATIENT INSTRUCTIONS
[facility]  [address] [place] [address]      YOUR SURGERY DATE IS 4/10/23    LABWORK IS SCHEDULED FOR TODAY. PLEASE SIGN IN ON 1ST FLOOR OF THE  [facility] FOR LAB.       DO NOT EAT OR DRINK ANYTHING AFTER MIDNIGHT BEFORE YOU SURGERY    BRING ALL MEDICATIONS YOU ARE CURRENTLY TAKING WITH YOU.  IF YOU ARE TAKING  A BLOOD PRESSURE MEDICATION, TAKE YOUR BLOOD PRESSURE MEDICATION WITH A   SIP OF WATER WHEN YOU GET UP THE MORNING OF SURGERY.     YOU MUST PRE-ADMIT AT THE FOLLOWING WEBSITE:  WEBSITE: [email or web address]  PASSWORD: [email or web address]    A STAFF MEMBER FROM THE [facility] WILL CALL YOU THE DAY BEFORE  SURGERY TO LET YOU KNOW WHAT TIME TO ARRIVE THE MORNING OF SURGERY.  IF YOU HAVE NOT HEARD FROM THEM BY 4 P.M. THE DAY BEFORE YOUR SURGERY,   PLEASE CALL THEM -[phone].      IF YOU HAVE ANY QUESTIONS YOU MAY CONTACT OUR OFFICE -[phone].

## 2023-04-10 ENCOUNTER — OUTSIDE PLACE OF SERVICE (OUTPATIENT)
Dept: SURGERY | Facility: CLINIC | Age: 43
End: 2023-04-10
Payer: COMMERCIAL

## 2023-04-10 PROCEDURE — 11470 EXC SKN H/P/P/U SMPL/NTRM: CPT | Mod: ,,, | Performed by: SURGERY

## 2023-04-10 PROCEDURE — 88312 SPECIAL STAINS GROUP 1: CPT | Mod: TC,SUR | Performed by: SURGERY

## 2023-04-10 PROCEDURE — 88304 TISSUE EXAM BY PATHOLOGIST: CPT | Mod: 26,,, | Performed by: PATHOLOGY

## 2023-04-10 PROCEDURE — 88304 TISSUE EXAM BY PATHOLOGIST: CPT | Mod: TC,SUR | Performed by: SURGERY

## 2023-04-10 PROCEDURE — 11470 PR EXC SWEAT GLAND LESN PERINEAL,SIMPL: ICD-10-PCS | Mod: ,,, | Performed by: SURGERY

## 2023-04-10 PROCEDURE — 88312 SPECIAL STAINS GROUP 1: CPT | Mod: 26,,, | Performed by: PATHOLOGY

## 2023-04-10 PROCEDURE — 88312 SURGICAL PATHOLOGY: ICD-10-PCS | Mod: 26,,, | Performed by: PATHOLOGY

## 2023-04-10 PROCEDURE — 88304 SURGICAL PATHOLOGY: ICD-10-PCS | Mod: 26,,, | Performed by: PATHOLOGY

## 2023-04-10 NOTE — PROGRESS NOTES
Subjective:       Patient ID: Ulisses Ortiz is a 42 y.o. male.    Chief Complaint: Hidradenitis Suppurativa (Dr Cazares is seeing patient for Hidradenitis and is on accutane, just finished steroids)    41 y/o male patient with a history of hidradenitis, who is seeing Dr Sohail Cazares for medical management, has developed abscess of the gluteal cleft and bilateral buttocks. He reports tenderness and mild drainage.  He presents for surgical management of hidradenitis.    Hidradenitis Suppurativa    family history includes Diabetes in his mother; Hypertension in his father.  Past Medical History:   Diagnosis Date    Hidradenitis suppurativa       History reviewed. No pertinent surgical history.    reports that he has never smoked. He has never been exposed to tobacco smoke. He has never used smokeless tobacco. He reports current alcohol use. He reports current drug use. Drug: Marijuana.     Review of Systems   All other systems reviewed and are negative.       Objective:      /70   Pulse 69   Temp 98.3 °F (36.8 °C)   SpO2 98%    Physical Exam  Cardiovascular:      Rate and Rhythm: Normal rate.      Heart sounds: No murmur heard.  Abdominal:      Palpations: Abdomen is soft.   Musculoskeletal:         General: No swelling.   Skin:     Comments: Left buttocks with 5.5 cm x 3.5 cm area of fluctuance and tenderness  Right buttocks with 3 cm x 4.5 cm area of fluctuance and tenderness   Neurological:      Mental Status: He is alert.   Psychiatric:         Mood and Affect: Mood normal.       Assessment/Plan:         Hidradenitis suppurativa  -     Ambulatory referral/consult to General Surgery  -     Cancel: Ambulatory Referral to External Surgery  -     CBC Auto Differential; Future; Expected date: 04/04/2023  -     Basic Metabolic Panel; Future; Expected date: 04/04/2023  -     Cancel: Ambulatory Referral to External Surgery  -     Ambulatory Referral to External Surgery    Hidradenitis suppurativa of right axilla          Problem List Items Addressed This Visit          Derm    Hidradenitis suppurativa - Primary    Overview     Abscess and chronic tissue bilateral buttocks           Current Assessment & Plan     Plan incision and drainage of bilateral buttocks abscess with excision of chronic hidradenitis.  Will perform urgently, due to active infection (within 5-7 days)  R/b include infection, bleeding, recurrence and unforeseen. He understands, wishes to proceed.           Relevant Orders    CBC Auto Differential (Completed)    Basic Metabolic Panel (Completed)    Ambulatory Referral to External Surgery

## 2023-04-10 NOTE — ASSESSMENT & PLAN NOTE
Plan incision and drainage of bilateral buttocks abscess with excision of chronic hidradenitis.  Will perform urgently, due to active infection, risk of sepsis (within 5-7 days)  R/b include infection, bleeding, recurrence and unforeseen. He understands, wishes to proceed.

## 2023-04-11 ENCOUNTER — LAB REQUISITION (OUTPATIENT)
Dept: LAB | Facility: HOSPITAL | Age: 43
End: 2023-04-11
Attending: SURGERY
Payer: COMMERCIAL

## 2023-04-11 DIAGNOSIS — L02.33 CARBUNCLE OF BUTTOCK: ICD-10-CM

## 2023-04-12 LAB
DHEA SERPL-MCNC: NORMAL
ESTROGEN SERPL-MCNC: NORMAL PG/ML
INSULIN SERPL-ACNC: NORMAL U[IU]/ML
LAB AP CLINICAL INFORMATION: NORMAL
LAB AP GROSS DESCRIPTION: NORMAL
LAB AP LABORATORY NOTES: NORMAL
T3RU NFR SERPL: NORMAL %

## 2023-04-18 ENCOUNTER — OFFICE VISIT (OUTPATIENT)
Dept: DERMATOLOGY | Facility: CLINIC | Age: 43
End: 2023-04-18
Payer: COMMERCIAL

## 2023-04-18 DIAGNOSIS — L73.2 HIDRADENITIS SUPPURATIVA: ICD-10-CM

## 2023-04-18 DIAGNOSIS — Z79.899 HIGH RISK MEDICATION USE: Primary | ICD-10-CM

## 2023-04-18 PROCEDURE — 3044F HG A1C LEVEL LT 7.0%: CPT | Mod: ,,, | Performed by: STUDENT IN AN ORGANIZED HEALTH CARE EDUCATION/TRAINING PROGRAM

## 2023-04-18 PROCEDURE — 1159F MED LIST DOCD IN RCRD: CPT | Mod: ,,, | Performed by: STUDENT IN AN ORGANIZED HEALTH CARE EDUCATION/TRAINING PROGRAM

## 2023-04-18 PROCEDURE — 99214 PR OFFICE/OUTPT VISIT, EST, LEVL IV, 30-39 MIN: ICD-10-PCS | Mod: ,,, | Performed by: STUDENT IN AN ORGANIZED HEALTH CARE EDUCATION/TRAINING PROGRAM

## 2023-04-18 PROCEDURE — 99214 OFFICE O/P EST MOD 30 MIN: CPT | Mod: ,,, | Performed by: STUDENT IN AN ORGANIZED HEALTH CARE EDUCATION/TRAINING PROGRAM

## 2023-04-18 PROCEDURE — 1159F PR MEDICATION LIST DOCUMENTED IN MEDICAL RECORD: ICD-10-PCS | Mod: ,,, | Performed by: STUDENT IN AN ORGANIZED HEALTH CARE EDUCATION/TRAINING PROGRAM

## 2023-04-18 PROCEDURE — 3044F PR MOST RECENT HEMOGLOBIN A1C LEVEL <7.0%: ICD-10-PCS | Mod: ,,, | Performed by: STUDENT IN AN ORGANIZED HEALTH CARE EDUCATION/TRAINING PROGRAM

## 2023-04-18 RX ORDER — PREDNISONE 20 MG/1
TABLET ORAL
Qty: 25 TABLET | Refills: 0 | Status: SHIPPED | OUTPATIENT
Start: 2023-04-18 | End: 2023-05-09

## 2023-04-18 NOTE — PROGRESS NOTES
[facility]   Sohail Cazares MD    Patient Name: Ulisses Ortiz  Patient YOB: 1980  Date of Service: 4/18/23    CC: Isotretinoin Follow-up    HPI: Ulisses Ortiz is a 42 y.o. male who is following up for hidradenitis currently on isotretinoin.  His current dose is 40 mg bid and his cumulative dose is 38 mg/kg.  He has followed the treatment plan as prescribed.  Overall, his hidradenitis suppurativa has improved despite flaring of his thighs today.  Side effects include dry lips.    Review of systems was negative for headache, upset stomach, joint pain, and mood change.    Past Medical History:   Diagnosis Date    Hidradenitis suppurativa      No past surgical history on file.  Review of patient's allergies indicates:  No Known Allergies    Current Outpatient Medications:     clindamycin phosphate 1% (CLINDAGEL) 1 % gel, Apply topically 2 (two) times daily., Disp: 60 g, Rfl: 0    doxycycline (MONODOX) 100 MG capsule, Take 1 capsule (100 mg total) by mouth 2 (two) times daily. (Patient not taking: Reported on 4/4/2023), Disp: 14 capsule, Rfl: 0    ergocalciferol (ERGOCALCIFEROL) 50,000 unit Cap, Take 1 capsule (50,000 Units total) by mouth every 7 days., Disp: 12 capsule, Rfl: 1    ISOtretinoin (ACCUTANE) 40 MG capsule, Take 1 capsule (40 mg total) by mouth 2 (two) times daily., Disp: 60 capsule, Rfl: 0    Exam: A skin exam included his face, chest and back.  General Appearance of the patient is well developed and well nourished.  Orientation: alert and oriented x 3.  Mood and affect: pleasant.  Findings in the above examined areas were normal with the exception of the following exam descriptions below:    Hidradenitis Suppurativa  - Fistula formation and draining sinuses, weeping acneiform pustules and papules, scarring, and acneiform nodules  Coreas Stage: 3    Plan:   - Predniosone taper 40 mg x 7 days, 20 mg daily x 7 days, 10 mg daily x 7 days  - Isotretinoin  40 mg bid      Counseling.  Cleanse acneiform lesions and sinus tracts with anti-bacterial washes. Oral antibiotics can help reduce inflammation.  Discussed importance of not smoking and weight loss      Plan: Isotretinoin Monitoring.  Patient weight: 93.9 kg    Months of Therapy: 2  Dosage Month 1: 40 mg daily  Dosage Month 2: 40 mg bid       Cumulative Dosage: 38 mg/ kg    Treatment Protocol:  1 mg/kg until a cumulative dose of 120-150 mg/kg is reached.  Secondary Contraception Method: Male latex condom.  Labs: Pending  Counseling:  I reviewed the side effect in detail. Patient should get monthly blood tests, not donate blood, not drive at night if vision affected, and not share  medication.  Side Effects:  No Depression  Cheilitis - I recommended application of Vaseline or Aquaphor numerous times a day (as often as every hour) and before going to bed.  Xerosis - I recommended application of Cetaphil or CeraVe numerous times a day and before going to bed to all dry areas.  No Nosebleeds  No Headache  No Myalgia  No Hypercholesterolemia    Action Items:  Patient confirmed in [place] today.  Rx sent in today.         High Risk Medication Monitoring (Z79.899) : The risks and benefits of the medication were reviewed in full with the patient. Should any side effects occur, the patient will stop the medication and contact me immediately.  - will order LFTs and fasting TAGs    No follow-ups on file.    Sohail Cazares MD

## 2023-04-19 ENCOUNTER — CLINICAL SUPPORT (OUTPATIENT)
Dept: FAMILY MEDICINE | Facility: CLINIC | Age: 43
End: 2023-04-19
Payer: COMMERCIAL

## 2023-04-19 DIAGNOSIS — Z79.899 HIGH RISK MEDICATION USE: ICD-10-CM

## 2023-04-19 LAB
ALBUMIN SERPL BCP-MCNC: 2.8 G/DL (ref 3.5–5)
ALP SERPL-CCNC: 87 U/L (ref 45–115)
ALT SERPL W P-5'-P-CCNC: 29 U/L (ref 16–61)
AST SERPL W P-5'-P-CCNC: 22 U/L (ref 15–37)
BILIRUB DIRECT SERPL-MCNC: <0.1 MG/DL (ref 0–0.2)
BILIRUB SERPL-MCNC: 0.3 MG/DL (ref ?–1.2)
PROT SERPL-MCNC: 9.7 G/DL (ref 6.4–8.2)
TRIGL SERPL-MCNC: 81 MG/DL (ref 35–150)

## 2023-04-19 PROCEDURE — 84478 ASSAY OF TRIGLYCERIDES: CPT | Mod: ,,, | Performed by: CLINICAL MEDICAL LABORATORY

## 2023-04-19 PROCEDURE — 80076 HEPATIC FUNCTION PANEL: CPT | Mod: ,,, | Performed by: CLINICAL MEDICAL LABORATORY

## 2023-04-19 PROCEDURE — 84478 TRIGLYCERIDES: ICD-10-PCS | Mod: ,,, | Performed by: CLINICAL MEDICAL LABORATORY

## 2023-04-19 PROCEDURE — 80076 HEPATIC FUNCTION PANEL: ICD-10-PCS | Mod: ,,, | Performed by: CLINICAL MEDICAL LABORATORY

## 2023-04-20 ENCOUNTER — TELEPHONE (OUTPATIENT)
Dept: DERMATOLOGY | Facility: CLINIC | Age: 43
End: 2023-04-20
Payer: COMMERCIAL

## 2023-04-20 DIAGNOSIS — L73.2 HIDRADENITIS SUPPURATIVA: ICD-10-CM

## 2023-04-20 RX ORDER — ISOTRETINOIN 40 MG/1
40 CAPSULE ORAL 2 TIMES DAILY
Qty: 60 CAPSULE | Refills: 0 | Status: SHIPPED | OUTPATIENT
Start: 2023-04-20 | End: 2023-05-16 | Stop reason: SDUPTHER

## 2023-04-20 RX ORDER — ISOTRETINOIN 40 MG/1
40 CAPSULE ORAL 2 TIMES DAILY
Qty: 60 CAPSULE | Refills: 0 | Status: SHIPPED | OUTPATIENT
Start: 2023-04-20 | End: 2023-04-20

## 2023-04-21 ENCOUNTER — OFFICE VISIT (OUTPATIENT)
Dept: SURGERY | Facility: CLINIC | Age: 43
End: 2023-04-21
Attending: SURGERY
Payer: COMMERCIAL

## 2023-04-21 DIAGNOSIS — Z09 POSTOP CHECK: Primary | ICD-10-CM

## 2023-04-21 PROCEDURE — 1159F PR MEDICATION LIST DOCUMENTED IN MEDICAL RECORD: ICD-10-PCS | Mod: ,,, | Performed by: SURGERY

## 2023-04-21 PROCEDURE — 99213 OFFICE O/P EST LOW 20 MIN: CPT | Mod: PBBFAC | Performed by: SURGERY

## 2023-04-21 PROCEDURE — 99024 POSTOP FOLLOW-UP VISIT: CPT | Mod: ,,, | Performed by: SURGERY

## 2023-04-21 PROCEDURE — 3044F PR MOST RECENT HEMOGLOBIN A1C LEVEL <7.0%: ICD-10-PCS | Mod: ,,, | Performed by: SURGERY

## 2023-04-21 PROCEDURE — 3044F HG A1C LEVEL LT 7.0%: CPT | Mod: ,,, | Performed by: SURGERY

## 2023-04-21 PROCEDURE — 99024 PR POST-OP FOLLOW-UP VISIT: ICD-10-PCS | Mod: ,,, | Performed by: SURGERY

## 2023-04-21 PROCEDURE — 1159F MED LIST DOCD IN RCRD: CPT | Mod: ,,, | Performed by: SURGERY

## 2023-04-21 NOTE — PROGRESS NOTES
Status post excision bilateral hidradenitis and drainage of abscesses.  The patient reports he is doing fairly well with the occasional serous drainage.      On exam, incisions are healing nicely.  There is minimal tenderness.  The simple interrupted sutures and a few of the horizontal mattress sutures were removed, leaving the majority of the horizontal mattress sutures in place.  Patient will return in 4-5 days to remove the remainder of the sutures.

## 2023-04-28 ENCOUNTER — OFFICE VISIT (OUTPATIENT)
Dept: SURGERY | Facility: CLINIC | Age: 43
End: 2023-04-28
Attending: SURGERY
Payer: COMMERCIAL

## 2023-04-28 VITALS
OXYGEN SATURATION: 99 % | HEART RATE: 78 BPM | BODY MASS INDEX: 31.55 KG/M2 | HEIGHT: 69 IN | TEMPERATURE: 98 F | WEIGHT: 213 LBS

## 2023-04-28 DIAGNOSIS — Z09 POSTOP CHECK: Primary | ICD-10-CM

## 2023-04-28 PROCEDURE — 99214 OFFICE O/P EST MOD 30 MIN: CPT | Mod: PBBFAC | Performed by: SURGERY

## 2023-04-28 PROCEDURE — 3008F PR BODY MASS INDEX (BMI) DOCUMENTED: ICD-10-PCS | Mod: ,,, | Performed by: SURGERY

## 2023-04-28 PROCEDURE — 3008F BODY MASS INDEX DOCD: CPT | Mod: ,,, | Performed by: SURGERY

## 2023-04-28 PROCEDURE — 99024 POSTOP FOLLOW-UP VISIT: CPT | Mod: ,,, | Performed by: SURGERY

## 2023-04-28 PROCEDURE — 99024 PR POST-OP FOLLOW-UP VISIT: ICD-10-PCS | Mod: ,,, | Performed by: SURGERY

## 2023-04-28 PROCEDURE — 1159F PR MEDICATION LIST DOCUMENTED IN MEDICAL RECORD: ICD-10-PCS | Mod: ,,, | Performed by: SURGERY

## 2023-04-28 PROCEDURE — 3044F PR MOST RECENT HEMOGLOBIN A1C LEVEL <7.0%: ICD-10-PCS | Mod: ,,, | Performed by: SURGERY

## 2023-04-28 PROCEDURE — 1159F MED LIST DOCD IN RCRD: CPT | Mod: ,,, | Performed by: SURGERY

## 2023-04-28 PROCEDURE — 3044F HG A1C LEVEL LT 7.0%: CPT | Mod: ,,, | Performed by: SURGERY

## 2023-04-28 NOTE — PROGRESS NOTES
S/p excision of hidradenitis of buttocks    Removed remaining sutures     [place] wound daily with saline    F/u 1-2 weeks

## 2023-05-01 PROBLEM — Z13.6 ENCOUNTER FOR SCREENING FOR CARDIOVASCULAR DISORDERS: Status: RESOLVED | Noted: 2023-01-24 | Resolved: 2023-05-01

## 2023-05-16 ENCOUNTER — OFFICE VISIT (OUTPATIENT)
Dept: DERMATOLOGY | Facility: CLINIC | Age: 43
End: 2023-05-16
Payer: COMMERCIAL

## 2023-05-16 ENCOUNTER — LAB VISIT (OUTPATIENT)
Dept: PRIMARY CARE CLINIC | Facility: CLINIC | Age: 43
End: 2023-05-16

## 2023-05-16 ENCOUNTER — TELEPHONE (OUTPATIENT)
Dept: FAMILY MEDICINE | Facility: CLINIC | Age: 43
End: 2023-05-16
Payer: COMMERCIAL

## 2023-05-16 DIAGNOSIS — Z79.899 HIGH RISK MEDICATION USE: Primary | ICD-10-CM

## 2023-05-16 DIAGNOSIS — L73.2 HIDRADENITIS SUPPURATIVA: ICD-10-CM

## 2023-05-16 DIAGNOSIS — Z02.83 ENCOUNTER FOR DRUG SCREENING: Primary | ICD-10-CM

## 2023-05-16 PROCEDURE — 99499 PR PHYSICAL - DOT/CDL: ICD-10-PCS | Mod: ,,, | Performed by: NURSE PRACTITIONER

## 2023-05-16 PROCEDURE — 99214 OFFICE O/P EST MOD 30 MIN: CPT | Mod: ,,, | Performed by: STUDENT IN AN ORGANIZED HEALTH CARE EDUCATION/TRAINING PROGRAM

## 2023-05-16 PROCEDURE — 99214 PR OFFICE/OUTPT VISIT, EST, LEVL IV, 30-39 MIN: ICD-10-PCS | Mod: ,,, | Performed by: STUDENT IN AN ORGANIZED HEALTH CARE EDUCATION/TRAINING PROGRAM

## 2023-05-16 PROCEDURE — 99000 SPECIMEN HANDLING OFFICE-LAB: CPT | Mod: ,,, | Performed by: NURSE PRACTITIONER

## 2023-05-16 PROCEDURE — 3044F PR MOST RECENT HEMOGLOBIN A1C LEVEL <7.0%: ICD-10-PCS | Mod: ,,, | Performed by: STUDENT IN AN ORGANIZED HEALTH CARE EDUCATION/TRAINING PROGRAM

## 2023-05-16 PROCEDURE — 99499 UNLISTED E&M SERVICE: CPT | Mod: ,,, | Performed by: NURSE PRACTITIONER

## 2023-05-16 PROCEDURE — 1159F PR MEDICATION LIST DOCUMENTED IN MEDICAL RECORD: ICD-10-PCS | Mod: ,,, | Performed by: STUDENT IN AN ORGANIZED HEALTH CARE EDUCATION/TRAINING PROGRAM

## 2023-05-16 PROCEDURE — 99000 PR SPECIMEN HANDLING,DR OFF->LAB: ICD-10-PCS | Mod: ,,, | Performed by: NURSE PRACTITIONER

## 2023-05-16 PROCEDURE — 3044F HG A1C LEVEL LT 7.0%: CPT | Mod: ,,, | Performed by: STUDENT IN AN ORGANIZED HEALTH CARE EDUCATION/TRAINING PROGRAM

## 2023-05-16 PROCEDURE — 1159F MED LIST DOCD IN RCRD: CPT | Mod: ,,, | Performed by: STUDENT IN AN ORGANIZED HEALTH CARE EDUCATION/TRAINING PROGRAM

## 2023-05-16 RX ORDER — ISOTRETINOIN 40 MG/1
40 CAPSULE ORAL 2 TIMES DAILY
Qty: 60 CAPSULE | Refills: 0 | Status: SHIPPED | OUTPATIENT
Start: 2023-05-16 | End: 2023-06-13

## 2023-05-16 NOTE — PROGRESS NOTES
[facility]   Sohail Cazares MD    Patient Name: Ulisses Ortiz  Patient YOB: 1980  Date of Service: 5/16/23    CC: Isotretinoin Follow-up    HPI: Ulisses Ortiz is a 42 y.o. male who is following up for hidradenitis currently on isotretinoin.  His current dose is 40 mg bid and his cumulative dose is 62.1 mg/kg.  He has followed the treatment plan as prescribed.  Overall, his hidradenitis suppurativa has improved despite infrequent flaring. Side effects include dry lips.    Review of systems was negative for headache, upset stomach, joint pain, and mood change.    Past Medical History:   Diagnosis Date    Hidradenitis suppurativa      Past Surgical History:   Procedure Laterality Date    EXCISION OF HIDRADENITIS       Review of patient's allergies indicates:  No Known Allergies    Current Outpatient Medications:     clindamycin phosphate 1% (CLINDAGEL) 1 % gel, Apply topically 2 (two) times daily., Disp: 60 g, Rfl: 0    doxycycline (MONODOX) 100 MG capsule, Take 1 capsule (100 mg total) by mouth 2 (two) times daily. (Patient not taking: Reported on 4/4/2023), Disp: 14 capsule, Rfl: 0    ergocalciferol (ERGOCALCIFEROL) 50,000 unit Cap, Take 1 capsule (50,000 Units total) by mouth every 7 days., Disp: 12 capsule, Rfl: 1    ISOtretinoin (ACCUTANE) 40 MG capsule, Take 1 capsule (40 mg total) by mouth 2 (two) times daily., Disp: 60 capsule, Rfl: 0    Exam: A skin exam included his face, chest and back.  General Appearance of the patient is well developed and well nourished.  Orientation: alert and oriented x 3.  Mood and affect: pleasant.  Findings in the above examined areas were normal with the exception of the following exam descriptions below:    Hidradenitis Suppurativa  - Fistula formation and draining sinuses, weeping acneiform pustules and papules, scarring, and acneiform nodules  Coreas Stage: 3    Plan:   - Isotretinoin  40 mg bid     Counseling.  Cleanse acneiform lesions and sinus tracts  with anti-bacterial washes. Oral antibiotics can help reduce inflammation.  Discussed importance of not smoking and weight loss      Plan: Isotretinoin Monitoring.  Patient weight: 93.9 kg    Months of Therapy: 3  Dosage Month 1: 40 mg daily  Dosage Month 2: 40 mg bid   Dosage Month 3: 40 mg bid         Cumulative Dosage: 62.1 mg/ kg    Treatment Protocol:  1 mg/kg until a cumulative dose of 120-150 mg/kg is reached.  Secondary Contraception Method: Male latex condom.  Labs: Pending  Counseling:  I reviewed the side effect in detail. Patient should get monthly blood tests, not donate blood, not drive at night if vision affected, and not share  medication.  Side Effects:  No Depression  Cheilitis - I recommended application of Vaseline or Aquaphor numerous times a day (as often as every hour) and before going to bed.  Xerosis - I recommended application of Cetaphil or CeraVe numerous times a day and before going to bed to all dry areas.  No Nosebleeds  No Headache  No Myalgia  No Hypercholesterolemia    Action Items:  Patient confirmed in [employer] today.  Rx sent in today.         High Risk Medication Monitoring (Z79.899) : The risks and benefits of the medication were reviewed in full with the patient. Should any side effects occur, the patient will stop the medication and contact me immediately.  - will order LFTs and fasting TAGs    No follow-ups on file.    Sohail Cazares MD

## 2023-05-16 NOTE — TELEPHONE ENCOUNTER
Patient called and wanted see what they need to do to get a [facility] card for [place] for his Hidradenitis    I informed him he will need to talk to Dr Cazares who treats him for this

## 2023-06-13 ENCOUNTER — OFFICE VISIT (OUTPATIENT)
Dept: DERMATOLOGY | Facility: CLINIC | Age: 43
End: 2023-06-13
Payer: COMMERCIAL

## 2023-06-13 DIAGNOSIS — Z79.899 HIGH RISK MEDICATION USE: ICD-10-CM

## 2023-06-13 DIAGNOSIS — L73.2 HIDRADENITIS SUPPURATIVA: Primary | ICD-10-CM

## 2023-06-13 PROCEDURE — 1159F MED LIST DOCD IN RCRD: CPT | Mod: ,,, | Performed by: STUDENT IN AN ORGANIZED HEALTH CARE EDUCATION/TRAINING PROGRAM

## 2023-06-13 PROCEDURE — 1159F PR MEDICATION LIST DOCUMENTED IN MEDICAL RECORD: ICD-10-PCS | Mod: ,,, | Performed by: STUDENT IN AN ORGANIZED HEALTH CARE EDUCATION/TRAINING PROGRAM

## 2023-06-13 PROCEDURE — 3044F HG A1C LEVEL LT 7.0%: CPT | Mod: ,,, | Performed by: STUDENT IN AN ORGANIZED HEALTH CARE EDUCATION/TRAINING PROGRAM

## 2023-06-13 PROCEDURE — 99214 PR OFFICE/OUTPT VISIT, EST, LEVL IV, 30-39 MIN: ICD-10-PCS | Mod: ,,, | Performed by: STUDENT IN AN ORGANIZED HEALTH CARE EDUCATION/TRAINING PROGRAM

## 2023-06-13 PROCEDURE — 3044F PR MOST RECENT HEMOGLOBIN A1C LEVEL <7.0%: ICD-10-PCS | Mod: ,,, | Performed by: STUDENT IN AN ORGANIZED HEALTH CARE EDUCATION/TRAINING PROGRAM

## 2023-06-13 PROCEDURE — 99214 OFFICE O/P EST MOD 30 MIN: CPT | Mod: ,,, | Performed by: STUDENT IN AN ORGANIZED HEALTH CARE EDUCATION/TRAINING PROGRAM

## 2023-06-13 RX ORDER — PREDNISONE 20 MG/1
TABLET ORAL
Qty: 25 TABLET | Refills: 0 | Status: SHIPPED | OUTPATIENT
Start: 2023-06-13 | End: 2023-07-04

## 2023-06-13 RX ORDER — ISOTRETINOIN 40 MG/1
40 CAPSULE ORAL 2 TIMES DAILY
Qty: 60 CAPSULE | Refills: 0 | Status: SHIPPED | OUTPATIENT
Start: 2023-06-13 | End: 2023-06-21 | Stop reason: SDUPTHER

## 2023-06-13 RX ORDER — ISOTRETINOIN 40 MG/1
40 CAPSULE ORAL 2 TIMES DAILY
Qty: 60 CAPSULE | Refills: 0 | Status: CANCELLED | OUTPATIENT
Start: 2023-06-13 | End: 2023-12-12

## 2023-06-13 NOTE — PROGRESS NOTES
[facility]   Sohail Cazares MD    Patient Name: Ulisses Ortiz  Patient YOB: 1980  Date of Service: 6/13/23    CC: Isotretinoin Follow-up    HPI: Ulisses Ortiz is a 42 y.o. male who is following up for hidradenitis currently on isotretinoin.  His current dose is 40 mg bid and his cumulative dose is 87 mg/kg.  He has followed the treatment plan as prescribed.  Overall, his hidradenitis suppurativa has improved despite currently flaring. Side effects include dry lips.    Review of systems was negative for headache, upset stomach, joint pain, and mood change.    Past Medical History:   Diagnosis Date    Hidradenitis suppurativa      Past Surgical History:   Procedure Laterality Date    EXCISION OF HIDRADENITIS       Review of patient's allergies indicates:  No Known Allergies    Current Outpatient Medications:     clindamycin phosphate 1% (CLINDAGEL) 1 % gel, Apply topically 2 (two) times daily., Disp: 60 g, Rfl: 0    ergocalciferol (ERGOCALCIFEROL) 50,000 unit Cap, Take 1 capsule (50,000 Units total) by mouth every 7 days., Disp: 12 capsule, Rfl: 1    ISOtretinoin (ACCUTANE) 40 MG capsule, Take 1 capsule (40 mg total) by mouth 2 (two) times daily., Disp: 60 capsule, Rfl: 0    Exam: A skin exam included his face, chest and back.  General Appearance of the patient is well developed and well nourished.  Orientation: alert and oriented x 3.  Mood and affect: pleasant.  Findings in the above examined areas were normal with the exception of the following exam descriptions below:    Hidradenitis Suppurativa  - Fistula formation and draining sinuses, weeping acneiform pustules and papules, scarring, and acneiform nodules  Coreas Stage: 3    Plan:   - Isotretinoin  40 mg bid     Counseling.  Cleanse acneiform lesions and sinus tracts with anti-bacterial washes. Oral antibiotics can help reduce inflammation.  Discussed importance of not smoking and weight loss      Plan: Isotretinoin Monitoring.  Patient  weight: 96.6 kg    Months of Therapy: 4  Dosage Month 1: 40 mg daily  Dosage Month 2: 40 mg bid   Dosage Month 3: 40 mg bid   Dosage Month 4: 40 mg BID        Cumulative Dosage: 87 mg/ kg    Treatment Protocol:  1 mg/kg until a cumulative dose of 120-150 mg/kg is reached.  Secondary Contraception Method: Male latex condom.  Labs: Pending  Counseling:  I reviewed the side effect in detail. Patient should get monthly blood tests, not donate blood, not drive at night if vision affected, and not share  medication.  Side Effects:  No Depression  Cheilitis - I recommended application of Vaseline or Aquaphor numerous times a day (as often as every hour) and before going to bed.  Xerosis - I recommended application of Cetaphil or CeraVe numerous times a day and before going to bed to all dry areas.  No Nosebleeds  No Headache  No Myalgia  No Hypercholesterolemia    Action Items:  Patient confirmed in iPledge today.  Rx sent in today.  - will do prednisone taper due to current flare          High Risk Medication Monitoring (Z79.899) : The risks and benefits of the medication were reviewed in full with the patient. Should any side effects occur, the patient will stop the medication and contact me immediately.  - will order LFTs and fasting TAGs    No follow-ups on file.    Sohail Cazares MD

## 2023-06-20 ENCOUNTER — CLINICAL SUPPORT (OUTPATIENT)
Dept: FAMILY MEDICINE | Facility: CLINIC | Age: 43
End: 2023-06-20
Payer: COMMERCIAL

## 2023-06-20 DIAGNOSIS — Z79.899 HIGH RISK MEDICATION USE: ICD-10-CM

## 2023-06-20 LAB
ALBUMIN SERPL BCP-MCNC: 2.6 G/DL (ref 3.5–5)
ALP SERPL-CCNC: 93 U/L (ref 45–115)
ALT SERPL W P-5'-P-CCNC: 16 U/L (ref 16–61)
AST SERPL W P-5'-P-CCNC: 13 U/L (ref 15–37)
BILIRUB DIRECT SERPL-MCNC: <0.1 MG/DL (ref 0–0.2)
BILIRUB SERPL-MCNC: 0.1 MG/DL (ref ?–1.2)
PROT SERPL-MCNC: 8.4 G/DL (ref 6.4–8.2)
TRIGL SERPL-MCNC: 144 MG/DL (ref 35–150)

## 2023-06-20 PROCEDURE — 80076 HEPATIC FUNCTION PANEL: CPT | Mod: ,,, | Performed by: CLINICAL MEDICAL LABORATORY

## 2023-06-20 PROCEDURE — 80076 HEPATIC FUNCTION PANEL: ICD-10-PCS | Mod: ,,, | Performed by: CLINICAL MEDICAL LABORATORY

## 2023-06-20 PROCEDURE — 84478 TRIGLYCERIDES: ICD-10-PCS | Mod: ,,, | Performed by: CLINICAL MEDICAL LABORATORY

## 2023-06-20 PROCEDURE — 84478 ASSAY OF TRIGLYCERIDES: CPT | Mod: ,,, | Performed by: CLINICAL MEDICAL LABORATORY

## 2023-06-21 RX ORDER — ISOTRETINOIN 40 MG/1
40 CAPSULE ORAL 2 TIMES DAILY
Qty: 60 CAPSULE | Refills: 0 | Status: SHIPPED | OUTPATIENT
Start: 2023-06-21 | End: 2023-07-11 | Stop reason: SDUPTHER

## 2023-06-22 ENCOUNTER — TELEPHONE (OUTPATIENT)
Dept: DERMATOLOGY | Facility: CLINIC | Age: 43
End: 2023-06-22
Payer: COMMERCIAL

## 2023-07-11 ENCOUNTER — OFFICE VISIT (OUTPATIENT)
Dept: DERMATOLOGY | Facility: CLINIC | Age: 43
End: 2023-07-11
Payer: COMMERCIAL

## 2023-07-11 DIAGNOSIS — Z79.899 HIGH RISK MEDICATION USE: ICD-10-CM

## 2023-07-11 DIAGNOSIS — L73.2 HIDRADENITIS SUPPURATIVA: Primary | ICD-10-CM

## 2023-07-11 PROCEDURE — 99214 OFFICE O/P EST MOD 30 MIN: CPT | Mod: ,,, | Performed by: STUDENT IN AN ORGANIZED HEALTH CARE EDUCATION/TRAINING PROGRAM

## 2023-07-11 PROCEDURE — 1159F MED LIST DOCD IN RCRD: CPT | Mod: ,,, | Performed by: STUDENT IN AN ORGANIZED HEALTH CARE EDUCATION/TRAINING PROGRAM

## 2023-07-11 PROCEDURE — 1159F PR MEDICATION LIST DOCUMENTED IN MEDICAL RECORD: ICD-10-PCS | Mod: ,,, | Performed by: STUDENT IN AN ORGANIZED HEALTH CARE EDUCATION/TRAINING PROGRAM

## 2023-07-11 PROCEDURE — 3044F PR MOST RECENT HEMOGLOBIN A1C LEVEL <7.0%: ICD-10-PCS | Mod: ,,, | Performed by: STUDENT IN AN ORGANIZED HEALTH CARE EDUCATION/TRAINING PROGRAM

## 2023-07-11 PROCEDURE — 3044F HG A1C LEVEL LT 7.0%: CPT | Mod: ,,, | Performed by: STUDENT IN AN ORGANIZED HEALTH CARE EDUCATION/TRAINING PROGRAM

## 2023-07-11 PROCEDURE — 99214 PR OFFICE/OUTPT VISIT, EST, LEVL IV, 30-39 MIN: ICD-10-PCS | Mod: ,,, | Performed by: STUDENT IN AN ORGANIZED HEALTH CARE EDUCATION/TRAINING PROGRAM

## 2023-07-11 RX ORDER — ISOTRETINOIN 40 MG/1
40 CAPSULE ORAL 2 TIMES DAILY
Qty: 60 CAPSULE | Refills: 0 | Status: SHIPPED | OUTPATIENT
Start: 2023-07-11 | End: 2023-08-10 | Stop reason: SDUPTHER

## 2023-07-11 NOTE — PROGRESS NOTES
[facility]   Sohail Cazares MD    Patient Name: Ulisses Ortiz  Patient YOB: 1980  Date of Service: 7/11/23    CC: Isotretinoin Follow-up    HPI: Ulisses Ortiz is a 43 y.o. male who is following up for hidradenitis currently on isotretinoin.  His current dose is 40 mg bid and his cumulative dose is 111.8 mg/kg.  He has followed the treatment plan as prescribed.  Overall, his hidradenitis suppurativa has improved despite currently flaring in his buttocks and persistent sinus tracts of bilateral thighs. Side effects include dry lips. Pt is also concerned with previously excised cyst having constant drainage.     Review of systems was negative for headache, upset stomach, joint pain, and mood change.    Past Medical History:   Diagnosis Date    Hidradenitis suppurativa      Past Surgical History:   Procedure Laterality Date    EXCISION OF HIDRADENITIS       Review of patient's allergies indicates:  No Known Allergies    Current Outpatient Medications:     clindamycin phosphate 1% (CLINDAGEL) 1 % gel, Apply topically 2 (two) times daily., Disp: 60 g, Rfl: 0    ergocalciferol (ERGOCALCIFEROL) 50,000 unit Cap, Take 1 capsule (50,000 Units total) by mouth every 7 days., Disp: 12 capsule, Rfl: 1    ISOtretinoin (ACCUTANE) 40 MG capsule, Take 1 capsule (40 mg total) by mouth 2 (two) times daily., Disp: 60 capsule, Rfl: 0    Exam: A skin exam included his face, chest and back.  General Appearance of the patient is well developed and well nourished.  Orientation: alert and oriented x 3.  Mood and affect: pleasant.  Findings in the above examined areas were normal with the exception of the following exam descriptions below:    Hidradenitis Suppurativa  - Fistula formation and draining sinuses, weeping acneiform pustules and papules, scarring, and acneiform nodules  Coreas Stage: 3    Plan:   - Isotretinoin  40 mg bid   - will refer back to Dr. Damico for drainage of previous excision site and draining  nodules of groin  Counseling.  Cleanse acneiform lesions and sinus tracts with anti-bacterial washes. Oral antibiotics can help reduce inflammation.  Discussed importance of not smoking and weight loss      Plan: Isotretinoin Monitoring.  Patient weight: 96.6 kg    Months of Therapy: 5  Dosage Month 1: 40 mg daily  Dosage Month 2: 40 mg bid   Dosage Month 3: 40 mg bid   Dosage Month 4: 40 mg BID  Dosage Month 5: 40 mg BID         Cumulative Dosage: 111.8 mg/ kg    Treatment Protocol:  1 mg/kg until a cumulative dose of 120-150 mg/kg is reached.  Secondary Contraception Method: Male latex condom.  Labs: Pending  Counseling:  I reviewed the side effect in detail. Patient should get monthly blood tests, not donate blood, not drive at night if vision affected, and not share  medication.  Side Effects:  No Depression  Cheilitis - I recommended application of Vaseline or Aquaphor numerous times a day (as often as every hour) and before going to bed.  Xerosis - I recommended application of Cetaphil or CeraVe numerous times a day and before going to bed to all dry areas.  No Nosebleeds  No Headache  No Myalgia  No Hypercholesterolemia    Action Items:  Patient confirmed in iPledge today.  Rx sent in today.  - will have patient be evaluated by Dr. Damico for possible excision of persistent tracts of thighs and gluteal cleft           High Risk Medication Monitoring (Z79.899) : The risks and benefits of the medication were reviewed in full with the patient. Should any side effects occur, the patient will stop the medication and contact me immediately.    Sohail Cazares MD

## 2023-07-17 ENCOUNTER — OFFICE VISIT (OUTPATIENT)
Dept: SURGERY | Facility: CLINIC | Age: 43
End: 2023-07-17
Attending: SURGERY
Payer: COMMERCIAL

## 2023-07-17 DIAGNOSIS — L73.2 HIDRADENITIS SUPPURATIVA: Primary | ICD-10-CM

## 2023-07-17 PROCEDURE — 99212 OFFICE O/P EST SF 10 MIN: CPT | Mod: PBBFAC | Performed by: SURGERY

## 2023-07-17 PROCEDURE — 1159F MED LIST DOCD IN RCRD: CPT | Mod: ,,, | Performed by: SURGERY

## 2023-07-17 PROCEDURE — 3044F PR MOST RECENT HEMOGLOBIN A1C LEVEL <7.0%: ICD-10-PCS | Mod: ,,, | Performed by: SURGERY

## 2023-07-17 PROCEDURE — 3044F HG A1C LEVEL LT 7.0%: CPT | Mod: ,,, | Performed by: SURGERY

## 2023-07-17 PROCEDURE — 1159F PR MEDICATION LIST DOCUMENTED IN MEDICAL RECORD: ICD-10-PCS | Mod: ,,, | Performed by: SURGERY

## 2023-07-17 PROCEDURE — 99213 PR OFFICE/OUTPT VISIT, EST, LEVL III, 20-29 MIN: ICD-10-PCS | Mod: S$PBB,,, | Performed by: SURGERY

## 2023-07-17 PROCEDURE — 99213 OFFICE O/P EST LOW 20 MIN: CPT | Mod: S$PBB,,, | Performed by: SURGERY

## 2023-07-17 NOTE — PROGRESS NOTES
Subjective:       Patient ID: Ulisses Ortiz is a 43 y.o. male.    Chief Complaint: Post-op Evaluation    43-year-old male patient who was seen by me in the past for hidradenitis of the buttocks.  He has gone to surgery for drainage.  I have not seen him in about 2 months, as he was essentially lost to follow-up.  He reports 2 areas of drainage on an intermittent basis.  He was sent back for re-evaluation by primary care provider.      family history includes Diabetes in his mother; Hypertension in his father.  Past Medical History:   Diagnosis Date    Hidradenitis suppurativa       Past Surgical History:   Procedure Laterality Date    EXCISION OF HIDRADENITIS         reports that he has never smoked. He has never been exposed to tobacco smoke. He has never used smokeless tobacco. He reports current alcohol use. He reports current drug use. Drug: Marijuana.     Review of Systems   All other systems reviewed and are negative.       Objective:      There were no vitals taken for this visit.   Physical Exam  Cardiovascular:      Heart sounds: No murmur heard.  Pulmonary:      Effort: Pulmonary effort is normal. No respiratory distress.   Abdominal:      Palpations: Abdomen is soft.   Skin:     Comments: Two punctate areas of ulceration and chronic granulation at the upper end of the gluteal cleft, 1 on the left and 1 on the right.  On the left side, the tract is significantly deeper  than the 1 on the right.  There was a mild amount of purulent fluid expressible.  There were 2 nodular areas of healing on either side of the gluteal cleft, closer to the anus.  There is ulceration of the skin, but minimal if any expressible purulence.   Neurological:      Mental Status: He is alert.   Psychiatric:         Mood and Affect: Mood normal.         Assessment/Plan:         Hidradenitis suppurativa         Problem List Items Addressed This Visit          Derm    Hidradenitis suppurativa - Primary    Overview     Had surgery a few  months ago for abscess.  The patient was lost to follow-up, but returns with recent mild drainage from wounds.         Current Assessment & Plan     Was treated with silver nitrate to 2 areas at the upper/proximal end of the gluteal cleft.  Follow-up in 2 weeks to re-evaluate, but may need a repeat trip to the operating room for debridement at some point

## 2023-07-17 NOTE — ASSESSMENT & PLAN NOTE
Was treated with silver nitrate to 2 areas at the upper/proximal end of the gluteal cleft.  Follow-up in 2 weeks to re-evaluate, but may need a repeat trip to the operating room for debridement at some point

## 2023-07-24 PROBLEM — Z09 POSTOP CHECK: Status: RESOLVED | Noted: 2023-04-21 | Resolved: 2023-07-24

## 2023-07-31 ENCOUNTER — OFFICE VISIT (OUTPATIENT)
Dept: SURGERY | Facility: CLINIC | Age: 43
End: 2023-07-31
Attending: SURGERY
Payer: COMMERCIAL

## 2023-07-31 DIAGNOSIS — L73.2 HIDRADENITIS SUPPURATIVA: Primary | ICD-10-CM

## 2023-07-31 PROCEDURE — 1159F MED LIST DOCD IN RCRD: CPT | Mod: ,,, | Performed by: SURGERY

## 2023-07-31 PROCEDURE — 1159F PR MEDICATION LIST DOCUMENTED IN MEDICAL RECORD: ICD-10-PCS | Mod: ,,, | Performed by: SURGERY

## 2023-07-31 PROCEDURE — 3044F HG A1C LEVEL LT 7.0%: CPT | Mod: ,,, | Performed by: SURGERY

## 2023-07-31 PROCEDURE — 99213 OFFICE O/P EST LOW 20 MIN: CPT | Mod: S$PBB,,, | Performed by: SURGERY

## 2023-07-31 PROCEDURE — 99212 OFFICE O/P EST SF 10 MIN: CPT | Mod: PBBFAC | Performed by: SURGERY

## 2023-07-31 PROCEDURE — 99213 PR OFFICE/OUTPT VISIT, EST, LEVL III, 20-29 MIN: ICD-10-PCS | Mod: S$PBB,,, | Performed by: SURGERY

## 2023-07-31 PROCEDURE — 3044F PR MOST RECENT HEMOGLOBIN A1C LEVEL <7.0%: ICD-10-PCS | Mod: ,,, | Performed by: SURGERY

## 2023-08-03 NOTE — PROGRESS NOTES
Subjective:       Patient ID: Ulisses Ortiz is a 43 y.o. male.    Chief Complaint: Follow-up    43-year-old male patient who was seen by me in the past for hidradenitis of the buttocks.  He has gone to surgery for drainage. Patient returns for check several months after I/D.  He is still having drainage, but reports improvement.      Follow-up        family history includes Diabetes in his mother; Hypertension in his father.  Past Medical History:   Diagnosis Date    Hidradenitis suppurativa       Past Surgical History:   Procedure Laterality Date    EXCISION OF HIDRADENITIS         reports that he has never smoked. He has never been exposed to tobacco smoke. He has never used smokeless tobacco. He reports current alcohol use. He reports current drug use. Drug: Marijuana.     Review of Systems   All other systems reviewed and are negative.         Objective:      There were no vitals taken for this visit.   Physical Exam  Cardiovascular:      Rate and Rhythm: Normal rate.   Pulmonary:      Effort: Pulmonary effort is normal.   Abdominal:      General: Abdomen is flat.      Palpations: Abdomen is soft.   Musculoskeletal:         General: No swelling.   Skin:     Comments: Two areas of drainage on the right side, medially  One area on left side of gluteal cleft   Neurological:      Mental Status: He is alert.   Psychiatric:         Mood and Affect: Mood normal.           Assessment/Plan:         Hidradenitis suppurativa         Problem List Items Addressed This Visit          Derm    Hidradenitis suppurativa - Primary    Overview     Had surgery a few months ago for abscess.  The patient continues to have mild drainage from wounds.         Current Assessment & Plan     Doing ok, but continues to have drainage.  Does not wish to have surgery for now.  Follow up in a few weeks, if persistent problems, to consider OR drainage.

## 2023-08-03 NOTE — ASSESSMENT & PLAN NOTE
Doing ok, but continues to have drainage.  Does not wish to have surgery for now.  Follow up in a few weeks, if persistent problems, to consider OR drainage.

## 2023-08-10 ENCOUNTER — OFFICE VISIT (OUTPATIENT)
Dept: DERMATOLOGY | Facility: CLINIC | Age: 43
End: 2023-08-10
Payer: COMMERCIAL

## 2023-08-10 DIAGNOSIS — L73.2 HIDRADENITIS SUPPURATIVA: ICD-10-CM

## 2023-08-10 DIAGNOSIS — Z79.899 HIGH RISK MEDICATION USE: Primary | ICD-10-CM

## 2023-08-10 PROCEDURE — 99214 PR OFFICE/OUTPT VISIT, EST, LEVL IV, 30-39 MIN: ICD-10-PCS | Mod: ,,, | Performed by: STUDENT IN AN ORGANIZED HEALTH CARE EDUCATION/TRAINING PROGRAM

## 2023-08-10 PROCEDURE — 3044F PR MOST RECENT HEMOGLOBIN A1C LEVEL <7.0%: ICD-10-PCS | Mod: ,,, | Performed by: STUDENT IN AN ORGANIZED HEALTH CARE EDUCATION/TRAINING PROGRAM

## 2023-08-10 PROCEDURE — 99214 OFFICE O/P EST MOD 30 MIN: CPT | Mod: ,,, | Performed by: STUDENT IN AN ORGANIZED HEALTH CARE EDUCATION/TRAINING PROGRAM

## 2023-08-10 PROCEDURE — 3044F HG A1C LEVEL LT 7.0%: CPT | Mod: ,,, | Performed by: STUDENT IN AN ORGANIZED HEALTH CARE EDUCATION/TRAINING PROGRAM

## 2023-08-10 RX ORDER — ISOTRETINOIN 40 MG/1
40 CAPSULE ORAL 2 TIMES DAILY
Qty: 60 CAPSULE | Refills: 0 | Status: SHIPPED | OUTPATIENT
Start: 2023-08-10 | End: 2023-09-09

## 2023-08-10 NOTE — PROGRESS NOTES
[facility]   Sohail Cazares MD    Patient Name: Ulisses Ortiz  Patient YOB: 1980  Date of Service: 8/10/23    CC: Isotretinoin Follow-up    HPI: Ulisses Ortiz is a 43 y.o. male who is following up for hidradenitis currently on isotretinoin.  His current dose is 40 mg bid and his cumulative dose is 136.6 mg/kg.  He has followed the treatment plan as prescribed.  Overall, his hidradenitis suppurativa has improved despite currently flaring in his buttocks and persistent sinus tracts of bilateral thighs. Side effects include dry lips. Pt is also concerned with previously excised cyst having constant drainage.     Review of systems was negative for headache, upset stomach, joint pain, and mood change.    Past Medical History:   Diagnosis Date    Hidradenitis suppurativa      Past Surgical History:   Procedure Laterality Date    EXCISION OF HIDRADENITIS       Review of patient's allergies indicates:  No Known Allergies    Current Outpatient Medications:     clindamycin phosphate 1% (CLINDAGEL) 1 % gel, Apply topically 2 (two) times daily., Disp: 60 g, Rfl: 0    ergocalciferol (ERGOCALCIFEROL) 50,000 unit Cap, Take 1 capsule (50,000 Units total) by mouth every 7 days. (Patient not taking: Reported on 7/17/2023), Disp: 12 capsule, Rfl: 1    ISOtretinoin (ACCUTANE) 40 MG capsule, Take 1 capsule (40 mg total) by mouth 2 (two) times daily., Disp: 60 capsule, Rfl: 0    Exam: A skin exam included his face, chest and back.  General Appearance of the patient is well developed and well nourished.  Orientation: alert and oriented x 3.  Mood and affect: pleasant.  Findings in the above examined areas were normal with the exception of the following exam descriptions below:    Hidradenitis Suppurativa  - Fistula formation and draining sinuses, weeping acneiform pustules and papules, scarring, and acneiform nodules  Coreas Stage: 3    Plan:   - Isotretinoin  40 mg bid   - will refer back to Dr. Damico for drainage  of previous excision site and draining nodules of groin  Counseling.  Cleanse acneiform lesions and sinus tracts with anti-bacterial washes. Oral antibiotics can help reduce inflammation.  Discussed importance of not smoking and weight loss      Plan: Isotretinoin Monitoring.  Patient weight: 96.6 kg    Months of Therapy: 6  Dosage Month 1: 40 mg daily  Dosage Month 2: 40 mg bid   Dosage Month 3: 40 mg bid   Dosage Month 4: 40 mg BID  Dosage Month 5: 40 mg BID   Dosage Month 6: 40 mg BID       Cumulative Dosage: 136.6 mg/ kg    Treatment Protocol:  1 mg/kg until a cumulative dose of 120-150 mg/kg is reached.  Secondary Contraception Method: Male latex condom.  Labs: Pending  Counseling:  I reviewed the side effect in detail. Patient should get monthly blood tests, not donate blood, not drive at night if vision affected, and not share  medication.  Side Effects:  No Depression  Cheilitis - I recommended application of Vaseline or Aquaphor numerous times a day (as often as every hour) and before going to bed.  Xerosis - I recommended application of Cetaphil or CeraVe numerous times a day and before going to bed to all dry areas.  No Nosebleeds  No Headache  No Myalgia  No Hypercholesterolemia    Action Items:  Patient confirmed in iPledge today.  Rx sent in today.           High Risk Medication Monitoring (Z79.899) : The risks and benefits of the medication were reviewed in full with the patient. Should any side effects occur, the patient will stop the medication and contact me immediately.    Sohail Cazares MD

## 2023-09-12 ENCOUNTER — OFFICE VISIT (OUTPATIENT)
Dept: DERMATOLOGY | Facility: CLINIC | Age: 43
End: 2023-09-12
Payer: COMMERCIAL

## 2023-09-12 DIAGNOSIS — L73.2 HIDRADENITIS SUPPURATIVA: Primary | ICD-10-CM

## 2023-09-12 PROCEDURE — 3044F HG A1C LEVEL LT 7.0%: CPT | Mod: ,,, | Performed by: STUDENT IN AN ORGANIZED HEALTH CARE EDUCATION/TRAINING PROGRAM

## 2023-09-12 PROCEDURE — 3044F PR MOST RECENT HEMOGLOBIN A1C LEVEL <7.0%: ICD-10-PCS | Mod: ,,, | Performed by: STUDENT IN AN ORGANIZED HEALTH CARE EDUCATION/TRAINING PROGRAM

## 2023-09-12 PROCEDURE — 99213 OFFICE O/P EST LOW 20 MIN: CPT | Mod: ,,, | Performed by: STUDENT IN AN ORGANIZED HEALTH CARE EDUCATION/TRAINING PROGRAM

## 2023-09-12 PROCEDURE — 99213 PR OFFICE/OUTPT VISIT, EST, LEVL III, 20-29 MIN: ICD-10-PCS | Mod: ,,, | Performed by: STUDENT IN AN ORGANIZED HEALTH CARE EDUCATION/TRAINING PROGRAM

## 2023-09-12 NOTE — PROGRESS NOTES
[facility]   Sohail Cazares MD    Patient Name: Ulisses Ortiz  Patient YOB: 1980  Date of Service: 9/12/23    CC: Isotretinoin Follow-up    HPI: Ulisses Ortiz is a 43 y.o. male who is following up for hidradenitis currently on isotretinoin.  His current dose is 40 mg bid and his cumulative dose is 161.4 mg/kg.  He has followed the treatment plan as prescribed.  Overall, his hidradenitis suppurativa has improved despite currently flaring in his buttocks and persistent sinus tracts of bilateral thighs. Side effects include dry lips. .     Review of systems was negative for headache, upset stomach, joint pain, and mood change.    Past Medical History:   Diagnosis Date    Hidradenitis suppurativa      Past Surgical History:   Procedure Laterality Date    EXCISION OF HIDRADENITIS       Review of patient's allergies indicates:  No Known Allergies    Current Outpatient Medications:     clindamycin phosphate 1% (CLINDAGEL) 1 % gel, Apply topically 2 (two) times daily., Disp: 60 g, Rfl: 0    ergocalciferol (ERGOCALCIFEROL) 50,000 unit Cap, Take 1 capsule (50,000 Units total) by mouth every 7 days. (Patient not taking: Reported on 7/17/2023), Disp: 12 capsule, Rfl: 1    ISOtretinoin (ACCUTANE) 40 MG capsule, Take 1 capsule (40 mg total) by mouth 2 (two) times daily., Disp: 60 capsule, Rfl: 0    Exam: A skin exam included his face, chest and back.  General Appearance of the patient is well developed and well nourished.  Orientation: alert and oriented x 3.  Mood and affect: pleasant.  Findings in the above examined areas were normal with the exception of the following exam descriptions below:    Hidradenitis Suppurativa  - Fistula formation and draining sinuses, weeping acneiform pustules and papules, scarring, and acneiform nodules  Coreas Stage: 3    Plan:   - ACCUTANE COMPLETION   Counseling.  Cleanse acneiform lesions and sinus tracts with anti-bacterial washes. Oral antibiotics can help reduce  inflammation.  Discussed importance of not smoking and weight loss      Plan: Isotretinoin Monitoring.  Patient weight: 96.6 kg    Months of Therapy: 6  Dosage Month 1: 40 mg daily  Dosage Month 2: 40 mg bid   Dosage Month 3: 40 mg bid   Dosage Month 4: 40 mg BID  Dosage Month 5: 40 mg BID   Dosage Month 6: 40 mg BID   Dosage Month 7: 40 mg BID        Cumulative Dosage: 161.4 mg/ kg    Treatment Protocol:  1 mg/kg until a cumulative dose of 120-150 mg/kg is reached.  Secondary Contraception Method: Male latex condom.  Labs: Pending  Counseling:  I reviewed the side effect in detail. Patient should get monthly blood tests, not donate blood, not drive at night if vision affected, and not share  medication.  Side Effects:  No Depression  Cheilitis - I recommended application of Vaseline or Aquaphor numerous times a day (as often as every hour) and before going to bed.  Xerosis - I recommended application of Cetaphil or CeraVe numerous times a day and before going to bed to all dry areas.  No Nosebleeds  No Headache  No Myalgia  No Hypercholesterolemia    Action Items:  - will discontinue isotretinoin today   - consider cosentyx in the future     RTC in 4 months.       High Risk Medication Monitoring (Z79.899) : The risks and benefits of the medication were reviewed in full with the patient. Should any side effects occur, the patient will stop the medication and contact me immediately.    Sohail Cazares MD